# Patient Record
Sex: FEMALE | Race: WHITE | URBAN - METROPOLITAN AREA
[De-identification: names, ages, dates, MRNs, and addresses within clinical notes are randomized per-mention and may not be internally consistent; named-entity substitution may affect disease eponyms.]

---

## 2017-12-27 ENCOUNTER — NURSE ONLY (OUTPATIENT)
Dept: PRIMARY CARE CLINIC | Age: 57
End: 2017-12-27

## 2017-12-27 DIAGNOSIS — Z23 NEED FOR INFLUENZA VACCINATION: Primary | ICD-10-CM

## 2017-12-27 PROCEDURE — 90471 IMMUNIZATION ADMIN: CPT | Performed by: FAMILY MEDICINE

## 2017-12-27 PROCEDURE — 90688 IIV4 VACCINE SPLT 0.5 ML IM: CPT | Performed by: FAMILY MEDICINE

## 2017-12-27 NOTE — PROGRESS NOTES
Vaccine Information Sheet, \"Influenza - Inactivated\"  given to Ronaldo Braun, or parent/legal guardian of  Ronaldo Braun and verbalized understanding. Patient responses:    Have you ever had a reaction to a flu vaccine? No  Are you able to eat eggs without adverse effects? Yes  Do you have any current illness? No  Have you ever had Guillian Christine Syndrome? No    Flu vaccine given per order. Please see immunization tab.

## 2018-05-16 ENCOUNTER — OFFICE VISIT (OUTPATIENT)
Dept: PRIMARY CARE CLINIC | Age: 58
End: 2018-05-16
Payer: COMMERCIAL

## 2018-05-16 VITALS
OXYGEN SATURATION: 97 % | SYSTOLIC BLOOD PRESSURE: 122 MMHG | RESPIRATION RATE: 14 BRPM | DIASTOLIC BLOOD PRESSURE: 82 MMHG | WEIGHT: 275 LBS | HEART RATE: 98 BPM | HEIGHT: 65 IN | BODY MASS INDEX: 45.82 KG/M2 | TEMPERATURE: 98.3 F

## 2018-05-16 DIAGNOSIS — E78.2 MIXED HYPERLIPIDEMIA: ICD-10-CM

## 2018-05-16 DIAGNOSIS — E03.9 HYPOTHYROIDISM (ACQUIRED): ICD-10-CM

## 2018-05-16 DIAGNOSIS — S29.019A THORACIC MYOFASCIAL STRAIN, INITIAL ENCOUNTER: ICD-10-CM

## 2018-05-16 DIAGNOSIS — J40 BRONCHITIS: Primary | ICD-10-CM

## 2018-05-16 DIAGNOSIS — D51.9 ANEMIA DUE TO VITAMIN B12 DEFICIENCY, UNSPECIFIED B12 DEFICIENCY TYPE: ICD-10-CM

## 2018-05-16 DIAGNOSIS — G89.29 CHRONIC RIGHT-SIDED THORACIC BACK PAIN: ICD-10-CM

## 2018-05-16 DIAGNOSIS — K57.30 DIVERTICULOSIS OF LARGE INTESTINE WITHOUT HEMORRHAGE: ICD-10-CM

## 2018-05-16 DIAGNOSIS — M54.6 CHRONIC RIGHT-SIDED THORACIC BACK PAIN: ICD-10-CM

## 2018-05-16 DIAGNOSIS — Z12.31 SCREENING MAMMOGRAM, ENCOUNTER FOR: ICD-10-CM

## 2018-05-16 LAB
ALBUMIN SERPL-MCNC: 4.5 G/DL (ref 3.9–4.9)
ALP BLD-CCNC: 53 U/L (ref 40–130)
ALT SERPL-CCNC: 35 U/L (ref 0–33)
ANION GAP SERPL CALCULATED.3IONS-SCNC: 18 MEQ/L (ref 7–13)
AST SERPL-CCNC: 30 U/L (ref 0–35)
BASOPHILS ABSOLUTE: 0 K/UL (ref 0–0.2)
BASOPHILS RELATIVE PERCENT: 0.6 %
BILIRUB SERPL-MCNC: 0.4 MG/DL (ref 0–1.2)
BUN BLDV-MCNC: 16 MG/DL (ref 6–20)
CALCIUM SERPL-MCNC: 9.8 MG/DL (ref 8.6–10.2)
CHLORIDE BLD-SCNC: 99 MEQ/L (ref 98–107)
CHOLESTEROL, TOTAL: 203 MG/DL (ref 0–199)
CO2: 23 MEQ/L (ref 22–29)
CREAT SERPL-MCNC: 0.62 MG/DL (ref 0.5–0.9)
EOSINOPHILS ABSOLUTE: 0.1 K/UL (ref 0–0.7)
EOSINOPHILS RELATIVE PERCENT: 1.6 %
GFR AFRICAN AMERICAN: >60
GFR NON-AFRICAN AMERICAN: >60
GLOBULIN: 2.9 G/DL (ref 2.3–3.5)
GLUCOSE BLD-MCNC: 80 MG/DL (ref 74–109)
HCT VFR BLD CALC: 42.9 % (ref 37–47)
HDLC SERPL-MCNC: 69 MG/DL (ref 40–59)
HEMOGLOBIN: 14.6 G/DL (ref 12–16)
LDL CHOLESTEROL CALCULATED: 105 MG/DL (ref 0–129)
LYMPHOCYTES ABSOLUTE: 2.8 K/UL (ref 1–4.8)
LYMPHOCYTES RELATIVE PERCENT: 37.3 %
MCH RBC QN AUTO: 31.4 PG (ref 27–31.3)
MCHC RBC AUTO-ENTMCNC: 34 % (ref 33–37)
MCV RBC AUTO: 92.4 FL (ref 82–100)
MONOCYTES ABSOLUTE: 0.6 K/UL (ref 0.2–0.8)
MONOCYTES RELATIVE PERCENT: 7.3 %
NEUTROPHILS ABSOLUTE: 4.1 K/UL (ref 1.4–6.5)
NEUTROPHILS RELATIVE PERCENT: 53.2 %
PDW BLD-RTO: 13.3 % (ref 11.5–14.5)
PLATELET # BLD: 235 K/UL (ref 130–400)
POTASSIUM SERPL-SCNC: 4.1 MEQ/L (ref 3.5–5.1)
RBC # BLD: 4.64 M/UL (ref 4.2–5.4)
SODIUM BLD-SCNC: 140 MEQ/L (ref 132–144)
T4 FREE: 1.2 NG/DL (ref 0.93–1.7)
TOTAL PROTEIN: 7.4 G/DL (ref 6.4–8.1)
TRIGL SERPL-MCNC: 145 MG/DL (ref 0–200)
TSH REFLEX: 4.52 UIU/ML (ref 0.27–4.2)
WBC # BLD: 7.6 K/UL (ref 4.8–10.8)

## 2018-05-16 PROCEDURE — 99214 OFFICE O/P EST MOD 30 MIN: CPT | Performed by: FAMILY MEDICINE

## 2018-05-16 PROCEDURE — 1036F TOBACCO NON-USER: CPT | Performed by: FAMILY MEDICINE

## 2018-05-16 PROCEDURE — G8427 DOCREV CUR MEDS BY ELIG CLIN: HCPCS | Performed by: FAMILY MEDICINE

## 2018-05-16 PROCEDURE — G8417 CALC BMI ABV UP PARAM F/U: HCPCS | Performed by: FAMILY MEDICINE

## 2018-05-16 PROCEDURE — 96372 THER/PROPH/DIAG INJ SC/IM: CPT | Performed by: FAMILY MEDICINE

## 2018-05-16 PROCEDURE — 3017F COLORECTAL CA SCREEN DOC REV: CPT | Performed by: FAMILY MEDICINE

## 2018-05-16 RX ORDER — CEFTRIAXONE 1 G/1
1 INJECTION, POWDER, FOR SOLUTION INTRAMUSCULAR; INTRAVENOUS ONCE
Status: COMPLETED | OUTPATIENT
Start: 2018-05-16 | End: 2018-05-16

## 2018-05-16 RX ORDER — CEFTRIAXONE 1 G/1
1 INJECTION, POWDER, FOR SOLUTION INTRAMUSCULAR; INTRAVENOUS ONCE
Qty: 1 G | Refills: 0
Start: 2018-05-16 | End: 2018-05-16

## 2018-05-16 RX ORDER — AZITHROMYCIN 250 MG/1
TABLET, FILM COATED ORAL
Qty: 6 TABLET | Refills: 0 | Status: SHIPPED | OUTPATIENT
Start: 2018-05-16 | End: 2018-10-30

## 2018-05-16 RX ORDER — ALBUTEROL SULFATE 90 UG/1
2 AEROSOL, METERED RESPIRATORY (INHALATION) EVERY 6 HOURS PRN
Qty: 1 INHALER | Refills: 0
Start: 2018-05-16

## 2018-05-16 RX ORDER — METHOCARBAMOL 500 MG/1
500-1000 TABLET, FILM COATED ORAL NIGHTLY
Qty: 60 TABLET | Refills: 0 | Status: SHIPPED | OUTPATIENT
Start: 2018-05-16 | End: 2019-02-18 | Stop reason: SDUPTHER

## 2018-05-16 RX ADMIN — CEFTRIAXONE 1 G: 1 INJECTION, POWDER, FOR SOLUTION INTRAMUSCULAR; INTRAVENOUS at 15:21

## 2018-05-16 ASSESSMENT — PATIENT HEALTH QUESTIONNAIRE - PHQ9
SUM OF ALL RESPONSES TO PHQ9 QUESTIONS 1 & 2: 0
2. FEELING DOWN, DEPRESSED OR HOPELESS: 0
SUM OF ALL RESPONSES TO PHQ QUESTIONS 1-9: 0
1. LITTLE INTEREST OR PLEASURE IN DOING THINGS: 0

## 2018-05-16 ASSESSMENT — ENCOUNTER SYMPTOMS
SINUS PRESSURE: 1
DIARRHEA: 0
SHORTNESS OF BREATH: 0
BACK PAIN: 0
EYE REDNESS: 0
VOMITING: 0
ABDOMINAL PAIN: 0
NAUSEA: 0
GASTROINTESTINAL NEGATIVE: 1
EYE ITCHING: 0
SWOLLEN GLANDS: 0
SINUS PAIN: 0
PHOTOPHOBIA: 0
WHEEZING: 0
CONSTIPATION: 0
COUGH: 1
SORE THROAT: 1
CHEST TIGHTNESS: 0
RHINORRHEA: 0
EYES NEGATIVE: 1

## 2018-09-19 ENCOUNTER — NURSE ONLY (OUTPATIENT)
Dept: PRIMARY CARE CLINIC | Age: 58
End: 2018-09-19
Payer: COMMERCIAL

## 2018-09-19 DIAGNOSIS — Z23 NEED FOR INFLUENZA VACCINATION: Primary | ICD-10-CM

## 2018-09-19 PROCEDURE — 90471 IMMUNIZATION ADMIN: CPT | Performed by: FAMILY MEDICINE

## 2018-09-19 PROCEDURE — 90688 IIV4 VACCINE SPLT 0.5 ML IM: CPT | Performed by: FAMILY MEDICINE

## 2018-10-30 ENCOUNTER — OFFICE VISIT (OUTPATIENT)
Dept: PRIMARY CARE CLINIC | Age: 58
End: 2018-10-30
Payer: COMMERCIAL

## 2018-10-30 VITALS
SYSTOLIC BLOOD PRESSURE: 124 MMHG | DIASTOLIC BLOOD PRESSURE: 82 MMHG | BODY MASS INDEX: 46.57 KG/M2 | WEIGHT: 279.5 LBS | RESPIRATION RATE: 14 BRPM | TEMPERATURE: 99.1 F | HEIGHT: 65 IN | HEART RATE: 82 BPM

## 2018-10-30 DIAGNOSIS — R50.9 FEVER, UNSPECIFIED FEVER CAUSE: ICD-10-CM

## 2018-10-30 DIAGNOSIS — J01.00 ACUTE NON-RECURRENT MAXILLARY SINUSITIS: Primary | ICD-10-CM

## 2018-10-30 DIAGNOSIS — J02.9 SORE THROAT: ICD-10-CM

## 2018-10-30 LAB
INFLUENZA A ANTIBODY: NEGATIVE
INFLUENZA B ANTIBODY: NEGATIVE
S PYO AG THROAT QL: NORMAL

## 2018-10-30 PROCEDURE — 87804 INFLUENZA ASSAY W/OPTIC: CPT | Performed by: NURSE PRACTITIONER

## 2018-10-30 PROCEDURE — 1036F TOBACCO NON-USER: CPT | Performed by: NURSE PRACTITIONER

## 2018-10-30 PROCEDURE — 3017F COLORECTAL CA SCREEN DOC REV: CPT | Performed by: NURSE PRACTITIONER

## 2018-10-30 PROCEDURE — G8417 CALC BMI ABV UP PARAM F/U: HCPCS | Performed by: NURSE PRACTITIONER

## 2018-10-30 PROCEDURE — G8427 DOCREV CUR MEDS BY ELIG CLIN: HCPCS | Performed by: NURSE PRACTITIONER

## 2018-10-30 PROCEDURE — 99213 OFFICE O/P EST LOW 20 MIN: CPT | Performed by: NURSE PRACTITIONER

## 2018-10-30 PROCEDURE — 87880 STREP A ASSAY W/OPTIC: CPT | Performed by: NURSE PRACTITIONER

## 2018-10-30 PROCEDURE — G8482 FLU IMMUNIZE ORDER/ADMIN: HCPCS | Performed by: NURSE PRACTITIONER

## 2018-10-30 RX ORDER — AZITHROMYCIN 250 MG/1
TABLET, FILM COATED ORAL
Qty: 1 PACKET | Refills: 0 | Status: SHIPPED | OUTPATIENT
Start: 2018-10-30 | End: 2019-02-18

## 2018-10-30 ASSESSMENT — ENCOUNTER SYMPTOMS
COUGH: 1
SHORTNESS OF BREATH: 1
WHEEZING: 1
RHINORRHEA: 1
SINUS PRESSURE: 1
SORE THROAT: 1

## 2018-10-30 NOTE — LETTER
[address]  [address] [facility] [address]  Phone: [phone]  Fax: 1 [facility], APRN - CNP        October 30, 2018     Patient: Breanna Rios   YOB: 1960   Date of Visit: 10/30/2018       To Whom It May Concern: It is my medical opinion that Dorita Bose may return to work on 11/1/18 and may be excused for missing 10/29-10/31. If you have any questions or concerns, please don't hesitate to call.     Sincerely,      Donna Brewer APRN - CNP

## 2018-10-30 NOTE — PROGRESS NOTES
Influenza A/B       Plan:      Orders Placed This Encounter   Procedures    POCT rapid strep A    POCT Influenza A/B     Results for POC orders placed in visit on 10/30/18   POCT Influenza A/B   Result Value Ref Range    Influenza A Ab negative     Influenza B Ab negative    POCT rapid strep A   Result Value Ref Range    Strep A Ag None Detected None Detected         Orders Placed This Encounter   Medications    azithromycin (ZITHROMAX) 250 MG tablet     Sig: Take 2 tabs (500 mg) on Day 1, and take 1 tab (250 mg) on days 2 through 5. Dispense:  1 packet     Refill:  0       Return if symptoms worsen or fail to improve. Encouraged increase in fluids and rest. Ibuprofen and tylenol as needed. Discussed otc comfort care. Reviewed with the patient: current clinicalstatus, medications, activities and diet. Side effects, adverse effects of the medication prescribedtoday, as well as treatment plan/ rationale and result expectations have been discussedwith the patient who expresses understanding and desires to proceed. Close follow upto evaluate treatment results and for coordination of care. I have reviewedthe patient's medical history in detail and updated the computerized patient record.     Jose Nguyen, APRN - CNP

## 2019-02-18 ENCOUNTER — OFFICE VISIT (OUTPATIENT)
Dept: PRIMARY CARE CLINIC | Age: 59
End: 2019-02-18
Payer: COMMERCIAL

## 2019-02-18 VITALS
RESPIRATION RATE: 16 BRPM | TEMPERATURE: 98.1 F | HEART RATE: 80 BPM | WEIGHT: 280 LBS | DIASTOLIC BLOOD PRESSURE: 80 MMHG | SYSTOLIC BLOOD PRESSURE: 130 MMHG | OXYGEN SATURATION: 98 % | BODY MASS INDEX: 46.65 KG/M2 | HEIGHT: 65 IN

## 2019-02-18 DIAGNOSIS — E03.9 HYPOTHYROIDISM, UNSPECIFIED TYPE: ICD-10-CM

## 2019-02-18 DIAGNOSIS — R10.11 RIGHT UPPER QUADRANT ABDOMINAL PAIN: ICD-10-CM

## 2019-02-18 DIAGNOSIS — M25.512 ACUTE PAIN OF LEFT SHOULDER: Primary | ICD-10-CM

## 2019-02-18 DIAGNOSIS — E78.2 MIXED HYPERLIPIDEMIA: ICD-10-CM

## 2019-02-18 DIAGNOSIS — R79.89 ABNORMAL LFTS: ICD-10-CM

## 2019-02-18 DIAGNOSIS — S29.019A THORACIC MYOFASCIAL STRAIN, INITIAL ENCOUNTER: ICD-10-CM

## 2019-02-18 LAB
ALBUMIN SERPL-MCNC: 4.2 G/DL (ref 3.5–4.6)
ALP BLD-CCNC: 43 U/L (ref 40–130)
ALT SERPL-CCNC: 22 U/L (ref 0–33)
ANION GAP SERPL CALCULATED.3IONS-SCNC: 15 MEQ/L (ref 9–15)
AST SERPL-CCNC: 23 U/L (ref 0–35)
BASOPHILS ABSOLUTE: 0.1 K/UL (ref 0–0.2)
BASOPHILS RELATIVE PERCENT: 1.1 %
BILIRUB SERPL-MCNC: 0.4 MG/DL (ref 0.2–0.7)
BUN BLDV-MCNC: 12 MG/DL (ref 6–20)
CALCIUM SERPL-MCNC: 9.9 MG/DL (ref 8.5–9.9)
CHLORIDE BLD-SCNC: 103 MEQ/L (ref 95–107)
CO2: 25 MEQ/L (ref 20–31)
CREAT SERPL-MCNC: 0.55 MG/DL (ref 0.5–0.9)
EOSINOPHILS ABSOLUTE: 0.1 K/UL (ref 0–0.7)
EOSINOPHILS RELATIVE PERCENT: 1.9 %
GFR AFRICAN AMERICAN: >60
GFR NON-AFRICAN AMERICAN: >60
GLOBULIN: 3.6 G/DL (ref 2.3–3.5)
GLUCOSE BLD-MCNC: 93 MG/DL (ref 70–99)
HCT VFR BLD CALC: 41.9 % (ref 37–47)
HEMOGLOBIN: 13.9 G/DL (ref 12–16)
LYMPHOCYTES ABSOLUTE: 3.1 K/UL (ref 1–4.8)
LYMPHOCYTES RELATIVE PERCENT: 42.7 %
MCH RBC QN AUTO: 30.6 PG (ref 27–31.3)
MCHC RBC AUTO-ENTMCNC: 33.1 % (ref 33–37)
MCV RBC AUTO: 92.4 FL (ref 82–100)
MONOCYTES ABSOLUTE: 0.5 K/UL (ref 0.2–0.8)
MONOCYTES RELATIVE PERCENT: 6.4 %
NEUTROPHILS ABSOLUTE: 3.5 K/UL (ref 1.4–6.5)
NEUTROPHILS RELATIVE PERCENT: 47.9 %
PDW BLD-RTO: 13.9 % (ref 11.5–14.5)
PLATELET # BLD: 231 K/UL (ref 130–400)
POTASSIUM SERPL-SCNC: 4.4 MEQ/L (ref 3.4–4.9)
RBC # BLD: 4.53 M/UL (ref 4.2–5.4)
SODIUM BLD-SCNC: 143 MEQ/L (ref 135–144)
TOTAL PROTEIN: 7.8 G/DL (ref 6.3–8)
WBC # BLD: 7.3 K/UL (ref 4.8–10.8)

## 2019-02-18 PROCEDURE — G8482 FLU IMMUNIZE ORDER/ADMIN: HCPCS | Performed by: FAMILY MEDICINE

## 2019-02-18 PROCEDURE — 1036F TOBACCO NON-USER: CPT | Performed by: FAMILY MEDICINE

## 2019-02-18 PROCEDURE — G8427 DOCREV CUR MEDS BY ELIG CLIN: HCPCS | Performed by: FAMILY MEDICINE

## 2019-02-18 PROCEDURE — G8417 CALC BMI ABV UP PARAM F/U: HCPCS | Performed by: FAMILY MEDICINE

## 2019-02-18 PROCEDURE — 99214 OFFICE O/P EST MOD 30 MIN: CPT | Performed by: FAMILY MEDICINE

## 2019-02-18 PROCEDURE — 3017F COLORECTAL CA SCREEN DOC REV: CPT | Performed by: FAMILY MEDICINE

## 2019-02-18 RX ORDER — PREDNISONE 10 MG/1
TABLET ORAL
Qty: 20 TABLET | Refills: 0 | Status: SHIPPED | OUTPATIENT
Start: 2019-02-18 | End: 2019-03-04

## 2019-02-18 RX ORDER — METHOCARBAMOL 500 MG/1
500-1000 TABLET, FILM COATED ORAL 3 TIMES DAILY
Qty: 90 TABLET | Refills: 1 | Status: SHIPPED | OUTPATIENT
Start: 2019-02-18 | End: 2019-02-28

## 2019-02-18 RX ORDER — OMEPRAZOLE 40 MG/1
40 CAPSULE, DELAYED RELEASE ORAL DAILY
Qty: 30 CAPSULE | Refills: 0 | Status: SHIPPED | OUTPATIENT
Start: 2019-02-18 | End: 2019-03-25 | Stop reason: SDUPTHER

## 2019-02-18 ASSESSMENT — ENCOUNTER SYMPTOMS
DIARRHEA: 0
HEMATOCHEZIA: 0
VOMITING: 0
FLATUS: 0
CONSTIPATION: 0
NAUSEA: 0
BELCHING: 0
ABDOMINAL PAIN: 1

## 2019-02-18 ASSESSMENT — PATIENT HEALTH QUESTIONNAIRE - PHQ9
2. FEELING DOWN, DEPRESSED OR HOPELESS: 1
SUM OF ALL RESPONSES TO PHQ9 QUESTIONS 1 & 2: 2
SUM OF ALL RESPONSES TO PHQ QUESTIONS 1-9: 2
1. LITTLE INTEREST OR PLEASURE IN DOING THINGS: 1
SUM OF ALL RESPONSES TO PHQ QUESTIONS 1-9: 2

## 2019-02-20 LAB
HCV QNT BY NAAT IU/ML: NOT DETECTED IU/ML
HCV QNT BY NAAT LOG IU/ML: NOT DETECTED LOG IU/ML
INTERPRETATION: NOT DETECTED

## 2019-02-20 ASSESSMENT — ENCOUNTER SYMPTOMS
EYES NEGATIVE: 1
PHOTOPHOBIA: 0
EYE ITCHING: 0
WHEEZING: 0
ABDOMINAL DISTENTION: 1
BACK PAIN: 0
EYE REDNESS: 0
SHORTNESS OF BREATH: 0
RESPIRATORY NEGATIVE: 1
BLOOD IN STOOL: 0
COUGH: 0

## 2019-03-18 ENCOUNTER — OFFICE VISIT (OUTPATIENT)
Dept: PRIMARY CARE CLINIC | Age: 59
End: 2019-03-18
Payer: COMMERCIAL

## 2019-03-18 VITALS
BODY MASS INDEX: 48.17 KG/M2 | WEIGHT: 289.1 LBS | DIASTOLIC BLOOD PRESSURE: 78 MMHG | HEIGHT: 65 IN | SYSTOLIC BLOOD PRESSURE: 122 MMHG | HEART RATE: 49 BPM | OXYGEN SATURATION: 100 % | TEMPERATURE: 97.7 F

## 2019-03-18 DIAGNOSIS — M54.5 ACUTE LOW BACK PAIN, UNSPECIFIED BACK PAIN LATERALITY, WITH SCIATICA PRESENCE UNSPECIFIED: Primary | ICD-10-CM

## 2019-03-18 DIAGNOSIS — M62.830 BACK SPASM: ICD-10-CM

## 2019-03-18 PROCEDURE — G8417 CALC BMI ABV UP PARAM F/U: HCPCS | Performed by: NURSE PRACTITIONER

## 2019-03-18 PROCEDURE — G8427 DOCREV CUR MEDS BY ELIG CLIN: HCPCS | Performed by: NURSE PRACTITIONER

## 2019-03-18 PROCEDURE — 99213 OFFICE O/P EST LOW 20 MIN: CPT | Performed by: NURSE PRACTITIONER

## 2019-03-18 PROCEDURE — 1036F TOBACCO NON-USER: CPT | Performed by: NURSE PRACTITIONER

## 2019-03-18 PROCEDURE — 3017F COLORECTAL CA SCREEN DOC REV: CPT | Performed by: NURSE PRACTITIONER

## 2019-03-18 PROCEDURE — G8482 FLU IMMUNIZE ORDER/ADMIN: HCPCS | Performed by: NURSE PRACTITIONER

## 2019-03-18 RX ORDER — PREDNISONE 20 MG/1
40 TABLET ORAL
COMMUNITY
Start: 2019-03-14 | End: 2019-03-19

## 2019-03-18 RX ORDER — TIZANIDINE HYDROCHLORIDE 4 MG/1
4 CAPSULE, GELATIN COATED ORAL
COMMUNITY
Start: 2019-03-14 | End: 2019-03-18 | Stop reason: ALTCHOICE

## 2019-03-18 RX ORDER — NAPROXEN 500 MG/1
500 TABLET ORAL 2 TIMES DAILY WITH MEALS
Qty: 60 TABLET | Refills: 3 | Status: SHIPPED | OUTPATIENT
Start: 2019-03-18

## 2019-03-18 RX ORDER — TIZANIDINE 4 MG/1
4 TABLET ORAL 3 TIMES DAILY
Qty: 30 TABLET | Refills: 0 | Status: SHIPPED | OUTPATIENT
Start: 2019-03-18

## 2019-03-18 RX ORDER — KETOROLAC TROMETHAMINE 10 MG/1
10 TABLET, FILM COATED ORAL
COMMUNITY
Start: 2019-03-14

## 2019-03-18 ASSESSMENT — ENCOUNTER SYMPTOMS
BACK PAIN: 1
BOWEL INCONTINENCE: 0
COLOR CHANGE: 0

## 2019-03-22 ENCOUNTER — TELEPHONE (OUTPATIENT)
Dept: FAMILY MEDICINE CLINIC | Age: 59
End: 2019-03-22

## 2019-03-25 DIAGNOSIS — R10.11 RIGHT UPPER QUADRANT ABDOMINAL PAIN: ICD-10-CM

## 2019-03-25 RX ORDER — OMEPRAZOLE 40 MG/1
40 CAPSULE, DELAYED RELEASE ORAL DAILY
Qty: 30 CAPSULE | Refills: 0 | Status: SHIPPED | OUTPATIENT
Start: 2019-03-25

## 2019-03-25 RX ORDER — OMEPRAZOLE 40 MG/1
40 CAPSULE, DELAYED RELEASE ORAL DAILY
Qty: 30 CAPSULE | Refills: 0 | Status: SHIPPED | OUTPATIENT
Start: 2019-03-25 | End: 2019-03-25 | Stop reason: SDUPTHER

## 2023-03-29 LAB
ALANINE AMINOTRANSFERASE (SGPT) (U/L) IN SER/PLAS: 19 U/L (ref 7–45)
ALBUMIN (G/DL) IN SER/PLAS: 4.1 G/DL (ref 3.4–5)
ALKALINE PHOSPHATASE (U/L) IN SER/PLAS: 46 U/L (ref 33–136)
ANION GAP IN SER/PLAS: 11 MMOL/L (ref 10–20)
ASPARTATE AMINOTRANSFERASE (SGOT) (U/L) IN SER/PLAS: 20 U/L (ref 9–39)
BASOPHILS (10*3/UL) IN BLOOD BY AUTOMATED COUNT: 0.04 X10E9/L (ref 0–0.1)
BASOPHILS/100 LEUKOCYTES IN BLOOD BY AUTOMATED COUNT: 0.5 % (ref 0–2)
BILIRUBIN TOTAL (MG/DL) IN SER/PLAS: 0.3 MG/DL (ref 0–1.2)
CALCIUM (MG/DL) IN SER/PLAS: 8.9 MG/DL (ref 8.6–10.3)
CARBON DIOXIDE, TOTAL (MMOL/L) IN SER/PLAS: 29 MMOL/L (ref 21–32)
CHLORIDE (MMOL/L) IN SER/PLAS: 104 MMOL/L (ref 98–107)
CHOLESTEROL (MG/DL) IN SER/PLAS: 205 MG/DL (ref 0–199)
CHOLESTEROL IN HDL (MG/DL) IN SER/PLAS: 63.5 MG/DL
CHOLESTEROL/HDL RATIO: 3.2
CREATININE (MG/DL) IN SER/PLAS: 1.01 MG/DL (ref 0.5–1.05)
EOSINOPHILS (10*3/UL) IN BLOOD BY AUTOMATED COUNT: 0.14 X10E9/L (ref 0–0.7)
EOSINOPHILS/100 LEUKOCYTES IN BLOOD BY AUTOMATED COUNT: 1.9 % (ref 0–6)
ERYTHROCYTE DISTRIBUTION WIDTH (RATIO) BY AUTOMATED COUNT: 14.1 % (ref 11.5–14.5)
ERYTHROCYTE MEAN CORPUSCULAR HEMOGLOBIN CONCENTRATION (G/DL) BY AUTOMATED: 32.8 G/DL (ref 32–36)
ERYTHROCYTE MEAN CORPUSCULAR VOLUME (FL) BY AUTOMATED COUNT: 91 FL (ref 80–100)
ERYTHROCYTES (10*6/UL) IN BLOOD BY AUTOMATED COUNT: 4.52 X10E12/L (ref 4–5.2)
GFR FEMALE: 64 ML/MIN/1.73M2
GLUCOSE (MG/DL) IN SER/PLAS: 94 MG/DL (ref 74–99)
HEMATOCRIT (%) IN BLOOD BY AUTOMATED COUNT: 41.2 % (ref 36–46)
HEMOGLOBIN (G/DL) IN BLOOD: 13.5 G/DL (ref 12–16)
IMMATURE GRANULOCYTES/100 LEUKOCYTES IN BLOOD BY AUTOMATED COUNT: 0.3 % (ref 0–0.9)
LDL: 106 MG/DL (ref 0–99)
LEUKOCYTES (10*3/UL) IN BLOOD BY AUTOMATED COUNT: 7.4 X10E9/L (ref 4.4–11.3)
LYMPHOCYTES (10*3/UL) IN BLOOD BY AUTOMATED COUNT: 3.29 X10E9/L (ref 1.2–4.8)
LYMPHOCYTES/100 LEUKOCYTES IN BLOOD BY AUTOMATED COUNT: 44.6 % (ref 13–44)
MONOCYTES (10*3/UL) IN BLOOD BY AUTOMATED COUNT: 0.55 X10E9/L (ref 0.1–1)
MONOCYTES/100 LEUKOCYTES IN BLOOD BY AUTOMATED COUNT: 7.5 % (ref 2–10)
NEUTROPHILS (10*3/UL) IN BLOOD BY AUTOMATED COUNT: 3.33 X10E9/L (ref 1.2–7.7)
NEUTROPHILS/100 LEUKOCYTES IN BLOOD BY AUTOMATED COUNT: 45.2 % (ref 40–80)
PLATELETS (10*3/UL) IN BLOOD AUTOMATED COUNT: 245 X10E9/L (ref 150–450)
POTASSIUM (MMOL/L) IN SER/PLAS: 4.4 MMOL/L (ref 3.5–5.3)
PROTEIN TOTAL: 7.7 G/DL (ref 6.4–8.2)
SODIUM (MMOL/L) IN SER/PLAS: 140 MMOL/L (ref 136–145)
THYROTROPIN (MIU/L) IN SER/PLAS BY DETECTION LIMIT <= 0.05 MIU/L: 2.34 MIU/L (ref 0.44–3.98)
TRIGLYCERIDE (MG/DL) IN SER/PLAS: 176 MG/DL (ref 0–149)
UREA NITROGEN (MG/DL) IN SER/PLAS: 13 MG/DL (ref 6–23)
VLDL: 35 MG/DL (ref 0–40)

## 2023-03-30 ENCOUNTER — TELEPHONE (OUTPATIENT)
Dept: PRIMARY CARE | Facility: CLINIC | Age: 63
End: 2023-03-30
Payer: COMMERCIAL

## 2023-03-30 LAB
CALCIDIOL (25 OH VITAMIN D3) (NG/ML) IN SER/PLAS: 24 NG/ML
THYROXINE (T4) (UG/DL) IN SER/PLAS: 10.2 UG/DL (ref 4.5–11.1)
THYROXINE (T4) FREE INDEX IN SER/PLAS BY CALCULATION: 3.2 (ref 1.6–4.7)
TRIIODOTHYRONINE RESIN UPTAKE (T3RU) % IN SER/PLAS: 31 % (ref 24–41)

## 2023-03-30 NOTE — TELEPHONE ENCOUNTER
----- Message from Todd Sparrow DO sent at 3/29/2023 11:57 PM EDT -----  Please notify patient of normal results.  Thank you

## 2023-03-31 ENCOUNTER — TELEPHONE (OUTPATIENT)
Dept: PRIMARY CARE | Facility: CLINIC | Age: 63
End: 2023-03-31
Payer: COMMERCIAL

## 2023-03-31 DIAGNOSIS — H81.11 BENIGN PAROXYSMAL POSITIONAL VERTIGO OF RIGHT EAR: Primary | ICD-10-CM

## 2023-03-31 RX ORDER — MECLIZINE HYDROCHLORIDE 25 MG/1
25 TABLET ORAL
Qty: 60 TABLET | Refills: 1 | Status: SHIPPED | OUTPATIENT
Start: 2023-03-31 | End: 2023-04-30

## 2023-03-31 NOTE — TELEPHONE ENCOUNTER
----- Message from Todd Sparrow DO sent at 3/30/2023 10:25 PM EDT -----  Vitamin D low at 24.  Continue with vitamin D supplementation at 3000-units daily.  Thank you

## 2023-04-04 ENCOUNTER — TELEPHONE (OUTPATIENT)
Dept: PRIMARY CARE | Facility: CLINIC | Age: 63
End: 2023-04-04
Payer: COMMERCIAL

## 2023-04-04 NOTE — TELEPHONE ENCOUNTER
----- Message from Todd Sparrow DO sent at 3/31/2023 11:45 PM EDT -----  Please notify patient of normal results.  Thank you

## 2023-04-05 DIAGNOSIS — E03.8 OTHER SPECIFIED HYPOTHYROIDISM: Primary | ICD-10-CM

## 2023-04-05 PROBLEM — E03.9 ACQUIRED HYPOTHYROIDISM: Status: ACTIVE | Noted: 2023-04-05

## 2023-04-05 RX ORDER — LEVOTHYROXINE SODIUM 50 UG/1
50 TABLET ORAL DAILY
Qty: 90 TABLET | Refills: 0 | Status: SHIPPED | OUTPATIENT
Start: 2023-04-05 | End: 2023-07-28 | Stop reason: SDUPTHER

## 2023-07-11 ENCOUNTER — TELEMEDICINE (OUTPATIENT)
Dept: PRIMARY CARE | Facility: CLINIC | Age: 63
End: 2023-07-11
Payer: COMMERCIAL

## 2023-07-11 VITALS — HEIGHT: 65 IN | BODY MASS INDEX: 44.15 KG/M2 | WEIGHT: 265 LBS

## 2023-07-11 DIAGNOSIS — N76.89 VAGINA BOIL: ICD-10-CM

## 2023-07-11 DIAGNOSIS — S39.93XA INJURY OF VAGINA, INITIAL ENCOUNTER: ICD-10-CM

## 2023-07-11 PROBLEM — E55.9 VITAMIN D DEFICIENCY: Status: ACTIVE | Noted: 2023-07-11

## 2023-07-11 PROBLEM — R53.83 FATIGUE: Status: ACTIVE | Noted: 2023-07-11

## 2023-07-11 PROBLEM — E78.5 HYPERLIPIDEMIA: Status: ACTIVE | Noted: 2023-07-11

## 2023-07-11 PROCEDURE — 99213 OFFICE O/P EST LOW 20 MIN: CPT | Performed by: FAMILY MEDICINE

## 2023-07-11 RX ORDER — AMOXICILLIN AND CLAVULANATE POTASSIUM 875; 125 MG/1; MG/1
875 TABLET, FILM COATED ORAL 2 TIMES DAILY
Qty: 20 TABLET | Refills: 0 | Status: SHIPPED | OUTPATIENT
Start: 2023-07-11 | End: 2023-07-21

## 2023-07-11 ASSESSMENT — ENCOUNTER SYMPTOMS
FEVER: 0
SPEECH DIFFICULTY: 0
DIZZINESS: 0
STRIDOR: 0
DYSPHORIC MOOD: 0
EYE PAIN: 0
DECREASED CONCENTRATION: 0
AGITATION: 0
MYALGIAS: 0
PALPITATIONS: 0
DIARRHEA: 0
SINUS PRESSURE: 0
FLANK PAIN: 0
POLYPHAGIA: 0
BLOOD IN STOOL: 0
SHORTNESS OF BREATH: 0
RHINORRHEA: 0
CONSTITUTIONAL NEGATIVE: 1
HEADACHES: 0
ADENOPATHY: 0
ABDOMINAL DISTENTION: 0
FATIGUE: 0
TROUBLE SWALLOWING: 0
APPETITE CHANGE: 0
ACTIVITY CHANGE: 0
CONFUSION: 0
COLOR CHANGE: 0
NECK STIFFNESS: 0
PHOTOPHOBIA: 0
CHEST TIGHTNESS: 0
RECTAL PAIN: 0
ARTHRALGIAS: 0
SLEEP DISTURBANCE: 0
SEIZURES: 0
COUGH: 0
HEMATURIA: 0
SINUS PAIN: 0
SORE THROAT: 0
CONSTIPATION: 0
ABDOMINAL PAIN: 0
NERVOUS/ANXIOUS: 0
POLYDIPSIA: 0
DYSURIA: 0

## 2023-07-11 ASSESSMENT — PATIENT HEALTH QUESTIONNAIRE - PHQ9
SUM OF ALL RESPONSES TO PHQ9 QUESTIONS 1 AND 2: 0
2. FEELING DOWN, DEPRESSED OR HOPELESS: NOT AT ALL
1. LITTLE INTEREST OR PLEASURE IN DOING THINGS: NOT AT ALL

## 2023-07-11 NOTE — PATIENT INSTRUCTIONS
Follow up in 10 days    Continue current medications and therapy for chronic medical conditions.    Patient was advised importance of proper diet/nutrition in addition adequate hydration. Patient was encouraged moderate exercise program to include 30 minutes daily for 5 days of the week or 150 minutes weekly. Patient will follow-up with us as scheduled.    OBGYN referral ordered     Take Augmentin 875mg BID x 10 days     Avoid bike riding/local irritation

## 2023-07-11 NOTE — PROGRESS NOTES
"Subjective   Patient ID: Anastacia Bhandari is a 62 y.o. female who presents for Vaginal Injury.    Patient is here due to feeing something in her vagina, states she was riding her stationary bike Sunday for 35 mins and felt a sore on the inside of her vagina wall that is marble sized. States she has a picture of it. Patient states her  told her it is red, swollen and is causing pain when she is walking.     Patient denies any other concerns          Review of Systems   Constitutional: Negative.  Negative for activity change, appetite change, fatigue and fever.   HENT:  Negative for congestion, dental problem, ear discharge, ear pain, mouth sores, rhinorrhea, sinus pressure, sinus pain, sore throat, tinnitus and trouble swallowing.    Eyes:  Negative for photophobia, pain and visual disturbance.   Respiratory:  Negative for cough, chest tightness, shortness of breath and stridor.    Cardiovascular:  Negative for chest pain and palpitations.   Gastrointestinal:  Negative for abdominal distention, abdominal pain, blood in stool, constipation, diarrhea and rectal pain.   Endocrine: Negative for cold intolerance, heat intolerance, polydipsia, polyphagia and polyuria.   Genitourinary:  Negative for dysuria, flank pain, hematuria and urgency.   Musculoskeletal:  Negative for arthralgias, gait problem, myalgias and neck stiffness.   Skin:  Negative for color change and rash.   Allergic/Immunologic: Negative for environmental allergies and food allergies.   Neurological:  Negative for dizziness, seizures, syncope, speech difficulty and headaches.   Hematological:  Negative for adenopathy.   Psychiatric/Behavioral:  Negative for agitation, confusion, decreased concentration, dysphoric mood and sleep disturbance. The patient is not nervous/anxious.        Objective   Ht 1.651 m (5' 5\")   Wt 120 kg (265 lb)   BMI 44.10 kg/m²     Physical Exam  Constitutional:       Appearance: Normal appearance. She is obese.   HENT: "      Head: Normocephalic.   Eyes:      Extraocular Movements: Extraocular movements intact.      Pupils: Pupils are equal, round, and reactive to light.   Abdominal:      General: There is distension.   Musculoskeletal:      Cervical back: Normal range of motion.   Psychiatric:         Mood and Affect: Mood normal.         Behavior: Behavior normal.         Thought Content: Thought content normal.         Judgment: Judgment normal.       Constitutional: Well developed, well nourished, alert and in no acute distress       Assessment/Plan   Problem List Items Addressed This Visit    None  Visit Diagnoses       Injury of vagina, initial encounter        Relevant Orders    Referral to Obstetrics / Gynecology    Vagina boil               Scribe Attestation  By signing my name below, IMonica , Scribe   attest that this documentation has been prepared under the direction and in the presence of Todd Sparrow DO.  Provider Attestation - Scribe documentation  All medical record entries made by the Scribe were at my direction and personally dictated by me. I have reviewed the chart and agree that the record accurately reflects my personal performance of the history, physical exam, discussion and plan.

## 2023-07-26 DIAGNOSIS — E03.8 OTHER SPECIFIED HYPOTHYROIDISM: ICD-10-CM

## 2023-07-26 NOTE — TELEPHONE ENCOUNTER
Patient of Dr. Sparrow     Refill request    Levothyroxine 50 mcg tablet       Pharmacy [facility]

## 2023-07-28 RX ORDER — LEVOTHYROXINE SODIUM 50 UG/1
50 TABLET ORAL DAILY
Qty: 90 TABLET | Refills: 1 | Status: SHIPPED | OUTPATIENT
Start: 2023-07-28 | End: 2023-08-21 | Stop reason: SDUPTHER

## 2023-07-28 NOTE — TELEPHONE ENCOUNTER
Patient of Dr. Sparrow      Refill request     Levothyroxine 50 mcg tablet         Pharmacy [facility]    PLEASE ADVISE

## 2023-08-21 DIAGNOSIS — E03.8 OTHER SPECIFIED HYPOTHYROIDISM: Primary | ICD-10-CM

## 2023-08-21 RX ORDER — LEVOTHYROXINE SODIUM 50 UG/1
50 TABLET ORAL DAILY
Qty: 90 TABLET | Refills: 1 | Status: SHIPPED | OUTPATIENT
Start: 2023-08-21 | End: 2023-11-03 | Stop reason: SDUPTHER

## 2023-10-10 ENCOUNTER — APPOINTMENT (OUTPATIENT)
Dept: PRIMARY CARE | Facility: CLINIC | Age: 63
End: 2023-10-10
Payer: COMMERCIAL

## 2023-11-03 DIAGNOSIS — E03.8 OTHER SPECIFIED HYPOTHYROIDISM: ICD-10-CM

## 2023-11-03 RX ORDER — LEVOTHYROXINE SODIUM 50 UG/1
50 TABLET ORAL DAILY
Qty: 90 TABLET | Refills: 1 | Status: SHIPPED | OUTPATIENT
Start: 2023-11-03 | End: 2024-04-15

## 2023-11-03 NOTE — TELEPHONE ENCOUNTER
PT  REFILL ON   levothyroxine (Synthroid, Levoxyl) 50    Pharmacy    [facility]/PHARMACY #[id number] - [facility] [id number] [address]

## 2024-03-19 ENCOUNTER — TELEMEDICINE (OUTPATIENT)
Dept: PRIMARY CARE | Facility: CLINIC | Age: 64
End: 2024-03-19
Payer: COMMERCIAL

## 2024-03-19 DIAGNOSIS — Z12.31 ENCOUNTER FOR SCREENING MAMMOGRAM FOR BREAST CANCER: ICD-10-CM

## 2024-03-19 DIAGNOSIS — A09 DIARRHEA OF INFECTIOUS ORIGIN: Primary | ICD-10-CM

## 2024-03-19 DIAGNOSIS — A08.4 GASTROENTERITIS AND COLITIS, VIRAL: ICD-10-CM

## 2024-03-19 PROBLEM — E66.01 OBESITY, MORBID (MULTI): Status: ACTIVE | Noted: 2024-03-19

## 2024-03-19 PROBLEM — G45.9 TIA (TRANSIENT ISCHEMIC ATTACK): Status: ACTIVE | Noted: 2024-03-19

## 2024-03-19 PROCEDURE — 99213 OFFICE O/P EST LOW 20 MIN: CPT | Performed by: FAMILY MEDICINE

## 2024-03-19 ASSESSMENT — ENCOUNTER SYMPTOMS
SORE THROAT: 0
SHORTNESS OF BREATH: 0
TROUBLE SWALLOWING: 0
SINUS PRESSURE: 0
RHINORRHEA: 0
STRIDOR: 0
ABDOMINAL PAIN: 1
SINUS PAIN: 0
COUGH: 0
CHEST TIGHTNESS: 0

## 2024-03-19 NOTE — PROGRESS NOTES
Subjective   Patient ID: Anastacia Bhandari is a 63 y.o. female who presents for Diarrhea.    VIRTUAL     Patient presents today for evaluation of diarrhea. Onset was last night. Patient has had about 7 episodes of diarrhea since this morning. Admits to abdominal discomfort. States her bowel movements are liquid and now a dark brown color.     Diarrhea   This is a new problem. The current episode started yesterday. The problem occurs 5 to 10 times per day. The problem has been unchanged. The stool consistency is described as Watery. Associated symptoms include abdominal pain. Pertinent negatives include no coughing.        Review of Systems   HENT:  Negative for congestion, dental problem, ear discharge, ear pain, mouth sores, rhinorrhea, sinus pressure, sinus pain, sore throat, tinnitus and trouble swallowing.    Respiratory:  Negative for cough, chest tightness, shortness of breath and stridor.    Gastrointestinal:  Positive for abdominal pain.       Objective   There were no vitals taken for this visit.    Physical Exam    Constitutional: Well developed, well nourished, alert and in no acute distress   Psychiatric: Mood calm and affect normal    Virtual Visit - Audio and Visual Communication Real Time      Assessment/Plan   Problem List Items Addressed This Visit    None  Visit Diagnoses         Codes    Diarrhea of infectious origin    -  Primary A09    Gastroenteritis and colitis, viral     A08.4    Encounter for screening mammogram for breast cancer     Z12.31    Relevant Orders    BI mammo bilateral screening tomosynthesis

## 2024-03-19 NOTE — PATIENT INSTRUCTIONS
Follow up in 1 week    Continue current medications and therapy for chronic medical conditions.    Patient was advised importance of proper diet/nutrition in addition adequate hydration. Patient was encouraged moderate exercise program to include 30 minutes daily for 5 days of the week or 150 minutes weekly. Patient will follow-up with us as scheduled.    BRAT diet for the next 48 hours    Start Flagyl 500 mg 3 times daily    Schedule screening mammogram

## 2024-03-20 DIAGNOSIS — R19.7 DIARRHEA, UNSPECIFIED TYPE: ICD-10-CM

## 2024-03-20 RX ORDER — LOPERAMIDE HCL 2 MG
2 TABLET ORAL 4 TIMES DAILY PRN
Qty: 30 TABLET | Refills: 0 | Status: SHIPPED | OUTPATIENT
Start: 2024-03-20 | End: 2024-03-30

## 2024-03-20 RX ORDER — METRONIDAZOLE 500 MG/1
500 TABLET ORAL 3 TIMES DAILY
Qty: 30 TABLET | Refills: 0 | Status: SHIPPED | OUTPATIENT
Start: 2024-03-20 | End: 2024-03-30

## 2024-03-21 ENCOUNTER — OFFICE VISIT (OUTPATIENT)
Dept: PRIMARY CARE | Facility: CLINIC | Age: 64
End: 2024-03-21
Payer: COMMERCIAL

## 2024-03-21 VITALS
HEART RATE: 88 BPM | DIASTOLIC BLOOD PRESSURE: 66 MMHG | SYSTOLIC BLOOD PRESSURE: 124 MMHG | BODY MASS INDEX: 45.92 KG/M2 | HEIGHT: 65 IN | WEIGHT: 275.6 LBS | OXYGEN SATURATION: 98 %

## 2024-03-21 DIAGNOSIS — E03.9 ACQUIRED HYPOTHYROIDISM: ICD-10-CM

## 2024-03-21 DIAGNOSIS — R10.13 EPIGASTRIC PAIN: ICD-10-CM

## 2024-03-21 DIAGNOSIS — R19.7 DIARRHEA, UNSPECIFIED TYPE: Primary | ICD-10-CM

## 2024-03-21 DIAGNOSIS — Z12.12 ENCOUNTER FOR COLORECTAL CANCER SCREENING: ICD-10-CM

## 2024-03-21 DIAGNOSIS — E78.2 MIXED HYPERLIPIDEMIA: ICD-10-CM

## 2024-03-21 DIAGNOSIS — E66.01 OBESITY, MORBID (MULTI): ICD-10-CM

## 2024-03-21 DIAGNOSIS — Z12.11 ENCOUNTER FOR COLORECTAL CANCER SCREENING: ICD-10-CM

## 2024-03-21 DIAGNOSIS — R14.2 BELCHING: ICD-10-CM

## 2024-03-21 PROCEDURE — 99214 OFFICE O/P EST MOD 30 MIN: CPT | Performed by: FAMILY MEDICINE

## 2024-03-21 RX ORDER — PANTOPRAZOLE SODIUM 40 MG/1
40 TABLET, DELAYED RELEASE ORAL DAILY
Qty: 30 TABLET | Refills: 1 | Status: SHIPPED | OUTPATIENT
Start: 2024-03-21 | End: 2024-04-12

## 2024-03-21 ASSESSMENT — ENCOUNTER SYMPTOMS
CONFUSION: 0
PHOTOPHOBIA: 0
FLANK PAIN: 0
DYSURIA: 0
SORE THROAT: 0
ADENOPATHY: 0
FATIGUE: 0
POLYPHAGIA: 0
SEIZURES: 0
EYE PAIN: 0
NERVOUS/ANXIOUS: 0
NECK STIFFNESS: 0
DIARRHEA: 1
STRIDOR: 0
AGITATION: 0
ABDOMINAL DISTENTION: 1
DECREASED CONCENTRATION: 0
DIZZINESS: 0
POLYDIPSIA: 0
ACTIVITY CHANGE: 0
ABDOMINAL PAIN: 0
CONSTIPATION: 0
PALPITATIONS: 0
HEADACHES: 0
DYSPHORIC MOOD: 0
SHORTNESS OF BREATH: 0
COLOR CHANGE: 0
BLOOD IN STOOL: 0
APPETITE CHANGE: 0
RECTAL PAIN: 0
CONSTITUTIONAL NEGATIVE: 1
FEVER: 0
SLEEP DISTURBANCE: 0
RHINORRHEA: 0
CHEST TIGHTNESS: 0
MYALGIAS: 0
SINUS PRESSURE: 0
HEMATURIA: 0
SINUS PAIN: 0
SPEECH DIFFICULTY: 0
TROUBLE SWALLOWING: 0
COUGH: 0
ARTHRALGIAS: 0

## 2024-03-21 NOTE — PROGRESS NOTES
"Subjective   Patient ID: Anastacia Bhandari is a 63 y.o. female who presents for Diarrhea.    Patient is here due to having abdominal ache. States she is not having pain. States he is experiencing diarrhea, states she has been taking the imodium and flagyl and is feeling her diarrhea is getting better and seems to be formed. Patient also states she has lost 5 pounds since onset of symptoms. Patient had a BM this morning and it was black/brown.     Denies any other concerns at this time.          Review of Systems   Constitutional: Negative.  Negative for activity change, appetite change, fatigue and fever.   HENT:  Negative for congestion, dental problem, ear discharge, ear pain, mouth sores, rhinorrhea, sinus pressure, sinus pain, sore throat, tinnitus and trouble swallowing.    Eyes:  Negative for photophobia, pain and visual disturbance.   Respiratory:  Negative for cough, chest tightness, shortness of breath and stridor.    Cardiovascular:  Negative for chest pain and palpitations.   Gastrointestinal:  Positive for abdominal distention and diarrhea. Negative for abdominal pain, blood in stool, constipation and rectal pain.        Abdominal discomfort   Belching    Endocrine: Negative for cold intolerance, heat intolerance, polydipsia, polyphagia and polyuria.   Genitourinary:  Negative for dysuria, flank pain, hematuria and urgency.   Musculoskeletal:  Negative for arthralgias, gait problem, myalgias and neck stiffness.   Skin:  Negative for color change and rash.   Allergic/Immunologic: Negative for environmental allergies and food allergies.   Neurological:  Negative for dizziness, seizures, syncope, speech difficulty and headaches.   Hematological:  Negative for adenopathy.   Psychiatric/Behavioral:  Negative for agitation, confusion, decreased concentration, dysphoric mood and sleep disturbance. The patient is not nervous/anxious.        Objective   /66   Pulse 88   Ht 1.651 m (5' 5\")   Wt 125 kg " (275 lb 9.6 oz)   SpO2 98%   BMI 45.86 kg/m²     Physical Exam  Vitals reviewed.   Constitutional:       General: She is not in acute distress.     Appearance: She is obese. She is not ill-appearing or diaphoretic.   HENT:      Head: Normocephalic.      Right Ear: Tympanic membrane and external ear normal.      Left Ear: Tympanic membrane and external ear normal.      Nose: Nose normal. No congestion.      Mouth/Throat:      Pharynx: No posterior oropharyngeal erythema.   Eyes:      General:         Right eye: No discharge.         Left eye: No discharge.      Extraocular Movements: Extraocular movements intact.      Conjunctiva/sclera: Conjunctivae normal.      Pupils: Pupils are equal, round, and reactive to light.   Cardiovascular:      Rate and Rhythm: Normal rate and regular rhythm.      Pulses: Normal pulses.      Heart sounds: Normal heart sounds. No murmur heard.  Pulmonary:      Effort: Pulmonary effort is normal. No respiratory distress.      Breath sounds: Normal breath sounds. No wheezing or rales.   Chest:      Chest wall: No tenderness.   Abdominal:      General: Bowel sounds are normal. There is distension.      Palpations: There is no mass.      Tenderness: There is abdominal tenderness. There is no guarding.   Musculoskeletal:         General: No tenderness. Normal range of motion.      Cervical back: Normal range of motion and neck supple. No tenderness.      Right lower leg: No edema.      Left lower leg: No edema.   Skin:     General: Skin is dry.      Coloration: Skin is not jaundiced.      Findings: No bruising, erythema or rash.   Neurological:      General: No focal deficit present.      Mental Status: She is alert and oriented to person, place, and time. Mental status is at baseline.      Cranial Nerves: No cranial nerve deficit.      Sensory: No sensory deficit.      Coordination: Coordination normal.      Gait: Gait normal.   Psychiatric:         Mood and Affect: Mood normal.          Thought Content: Thought content normal.         Judgment: Judgment normal.         Assessment/Plan   Problem List Items Addressed This Visit             ICD-10-CM    Acquired hypothyroidism E03.9    Relevant Orders    Thyroid Stimulating Hormone    Hyperlipidemia E78.5    Relevant Orders    CBC and Auto Differential    Comprehensive Metabolic Panel    Lipid Panel    Obesity, morbid (CMS/[facility]) E66.01     Other Visit Diagnoses         Codes    Diarrhea, unspecified type    -  Primary R19.7    Relevant Orders    CT abdomen pelvis wo IV contrast (Completed)    Amylase    Lipase    Epigastric pain     R10.13    Relevant Orders    CT abdomen pelvis wo IV contrast (Completed)    Amylase    Lipase    EGD    Encounter for colorectal cancer screening     Z12.11, Z12.12    Relevant Orders    Colonoscopy Screening; Average Risk Patient    Belching     R14.2    Relevant Medications    pantoprazole (ProtoNix) 40 mg EC tablet    Other Relevant Orders    EGD    BMI 45.0-49.9, adult (CMS/[facility])     Z68.42              Scribe Attestation  By signing my name below, IIlda RMA , Mattibe   attest that this documentation has been prepared under the direction and in the presence of Todd Sparrow DO.   Provider Attestation - Scribe documentation    All medical record entries made by the Scribe were at my direction and personally dictated by me. I have reviewed the chart and agree that the record accurately reflects my personal performance of the history, physical exam, discussion and plan.

## 2024-03-21 NOTE — PATIENT INSTRUCTIONS
Follow up in 7 days after CT scan     Continue current medications and therapy for chronic medical conditions.    Patient was advised importance of proper diet/nutrition in addition adequate hydration. Patient was encouraged moderate exercise program to include 30 minutes daily for 5 days of the week or 150 minutes weekly. Patient will follow-up with us as scheduled.    Obtain CT of abdomen and pelvis    Complete Colonoscopy/ EGD    Obtain Fasting Lipid, CMP, CBC, Amylase, lipase, and TSH     Start Pantoprazole 40mg once daily

## 2024-03-22 ENCOUNTER — HOSPITAL ENCOUNTER (OUTPATIENT)
Dept: RADIOLOGY | Facility: CLINIC | Age: 64
End: 2024-03-22
Payer: COMMERCIAL

## 2024-03-22 ENCOUNTER — HOSPITAL ENCOUNTER (OUTPATIENT)
Dept: RADIOLOGY | Facility: HOSPITAL | Age: 64
Discharge: HOME | End: 2024-03-22
Payer: COMMERCIAL

## 2024-03-22 DIAGNOSIS — R10.13 EPIGASTRIC PAIN: ICD-10-CM

## 2024-03-22 DIAGNOSIS — R19.7 DIARRHEA, UNSPECIFIED TYPE: ICD-10-CM

## 2024-03-22 PROCEDURE — 74176 CT ABD & PELVIS W/O CONTRAST: CPT

## 2024-03-22 PROCEDURE — 74176 CT ABD & PELVIS W/O CONTRAST: CPT | Performed by: RADIOLOGY

## 2024-03-24 ASSESSMENT — ENCOUNTER SYMPTOMS
HEMATURIA: 0
MYALGIAS: 0
ADENOPATHY: 0
ABDOMINAL PAIN: 0
CHEST TIGHTNESS: 0
TROUBLE SWALLOWING: 0
ARTHRALGIAS: 0
EYE PAIN: 0
PALPITATIONS: 0
DYSPHORIC MOOD: 0
ABDOMINAL DISTENTION: 1
BLOOD IN STOOL: 0
APPETITE CHANGE: 0
POLYDIPSIA: 0
RECTAL PAIN: 0
SHORTNESS OF BREATH: 0
FEVER: 0
CONSTIPATION: 0
AGITATION: 0
SEIZURES: 0
NERVOUS/ANXIOUS: 0
FATIGUE: 0
STRIDOR: 0
SORE THROAT: 0
SINUS PRESSURE: 0
COLOR CHANGE: 0
SPEECH DIFFICULTY: 0
DYSURIA: 0
DIZZINESS: 0
POLYPHAGIA: 0
NECK STIFFNESS: 0
DIARRHEA: 1
HEADACHES: 0
PHOTOPHOBIA: 0
RHINORRHEA: 0
CONSTITUTIONAL NEGATIVE: 1
SINUS PAIN: 0
COUGH: 0
FLANK PAIN: 0
ACTIVITY CHANGE: 0
DECREASED CONCENTRATION: 0
SLEEP DISTURBANCE: 0
CONFUSION: 0

## 2024-03-24 NOTE — PROGRESS NOTES
Subjective   Patient ID: Anastacia Bhandari is a 63 y.o. female who presents for Diarrhea.    Patient is here due to having abdominal ache. States she is not having pain. States he is experiencing diarrhea, states she has been taking the imodium and flagyl and is feeling her diarrhea is getting better and seems to be formed. Patient also states she has lost 5 pounds since onset of symptoms. Patient had a BM this morning and it was black/brown.     Denies any other concerns at this time.     Diarrhea   Pertinent negatives include no abdominal pain, arthralgias, coughing, fever, headaches or myalgias.        Review of Systems   Constitutional: Negative.  Negative for activity change, appetite change, fatigue and fever.   HENT:  Negative for congestion, dental problem, ear discharge, ear pain, mouth sores, rhinorrhea, sinus pressure, sinus pain, sore throat, tinnitus and trouble swallowing.    Eyes:  Negative for photophobia, pain and visual disturbance.   Respiratory:  Negative for cough, chest tightness, shortness of breath and stridor.    Cardiovascular:  Negative for chest pain and palpitations.   Gastrointestinal:  Positive for abdominal distention and diarrhea. Negative for abdominal pain, blood in stool, constipation and rectal pain.        Abdominal discomfort   Belching    Endocrine: Negative for cold intolerance, heat intolerance, polydipsia, polyphagia and polyuria.   Genitourinary:  Negative for dysuria, flank pain, hematuria and urgency.   Musculoskeletal:  Negative for arthralgias, gait problem, myalgias and neck stiffness.   Skin:  Negative for color change and rash.   Allergic/Immunologic: Negative for environmental allergies and food allergies.   Neurological:  Negative for dizziness, seizures, syncope, speech difficulty and headaches.   Hematological:  Negative for adenopathy.   Psychiatric/Behavioral:  Negative for agitation, confusion, decreased concentration, dysphoric mood and sleep disturbance.  "The patient is not nervous/anxious.        Objective   /66   Pulse 88   Ht 1.651 m (5' 5\")   Wt 125 kg (275 lb 9.6 oz)   SpO2 98%   BMI 45.86 kg/m²     Physical Exam  Vitals reviewed.   Constitutional:       General: She is not in acute distress.     Appearance: She is obese. She is not ill-appearing or diaphoretic.   HENT:      Head: Normocephalic.      Right Ear: Tympanic membrane and external ear normal.      Left Ear: Tympanic membrane and external ear normal.      Nose: Nose normal. No congestion.      Mouth/Throat:      Pharynx: No posterior oropharyngeal erythema.   Eyes:      General:         Right eye: No discharge.         Left eye: No discharge.      Extraocular Movements: Extraocular movements intact.      Conjunctiva/sclera: Conjunctivae normal.      Pupils: Pupils are equal, round, and reactive to light.   Cardiovascular:      Rate and Rhythm: Normal rate and regular rhythm.      Pulses: Normal pulses.      Heart sounds: Normal heart sounds. No murmur heard.  Pulmonary:      Effort: Pulmonary effort is normal. No respiratory distress.      Breath sounds: Normal breath sounds. No wheezing or rales.   Chest:      Chest wall: No tenderness.   Abdominal:      General: Bowel sounds are normal. There is distension.      Palpations: There is no mass.      Tenderness: There is abdominal tenderness. There is no guarding.   Musculoskeletal:         General: No tenderness. Normal range of motion.      Cervical back: Normal range of motion and neck supple. No tenderness.      Right lower leg: No edema.      Left lower leg: No edema.   Skin:     General: Skin is dry.      Coloration: Skin is not jaundiced.      Findings: No bruising, erythema or rash.   Neurological:      General: No focal deficit present.      Mental Status: She is alert and oriented to person, place, and time. Mental status is at baseline.      Cranial Nerves: No cranial nerve deficit.      Sensory: No sensory deficit.      " Coordination: Coordination normal.      Gait: Gait normal.   Psychiatric:         Mood and Affect: Mood normal.         Thought Content: Thought content normal.         Judgment: Judgment normal.         Assessment/Plan   Problem List Items Addressed This Visit             ICD-10-CM    Acquired hypothyroidism E03.9    Relevant Orders    Thyroid Stimulating Hormone    Hyperlipidemia E78.5    Relevant Orders    CBC and Auto Differential    Comprehensive Metabolic Panel    Lipid Panel    Obesity, morbid (CMS/[facility]) E66.01     Other Visit Diagnoses         Codes    Diarrhea, unspecified type    -  Primary R19.7    Relevant Orders    CT abdomen pelvis wo IV contrast (Completed)    Amylase    Lipase    Epigastric pain     R10.13    Relevant Orders    CT abdomen pelvis wo IV contrast (Completed)    Amylase    Lipase    EGD    Encounter for colorectal cancer screening     Z12.11, Z12.12    Relevant Orders    Colonoscopy Screening; Average Risk Patient    Belching     R14.2    Relevant Medications    pantoprazole (ProtoNix) 40 mg EC tablet    Other Relevant Orders    EGD    BMI 45.0-49.9, adult (CMS/[facility])     Z68.42              Scribe Attestation  By signing my name below, IIlda RMA , Leeroy   attest that this documentation has been prepared under the direction and in the presence of Todd Sparrow DO.   Provider Attestation - Scribe documentation    All medical record entries made by the Scribe were at my direction and personally dictated by me. I have reviewed the chart and agree that the record accurately reflects my personal performance of the history, physical exam, discussion and plan.

## 2024-03-25 ENCOUNTER — TELEPHONE (OUTPATIENT)
Dept: PRIMARY CARE | Facility: CLINIC | Age: 64
End: 2024-03-25
Payer: COMMERCIAL

## 2024-03-25 NOTE — TELEPHONE ENCOUNTER
----- Message from Todd Sparrow DO sent at 3/24/2024  7:54 PM EDT -----  Please check to see how patient is doing.  CT scan of the abdomen reviewed and will make sure diarrhea has cleared.  Thank you  ----- Message -----  From: Interface, Radiology Results In  Sent: 3/23/2024   4:59 PM EDT  To: Todd Sparrow DO

## 2024-03-26 DIAGNOSIS — Z12.11 COLON CANCER SCREENING: ICD-10-CM

## 2024-03-26 DIAGNOSIS — K44.9 HIATAL HERNIA: ICD-10-CM

## 2024-03-26 DIAGNOSIS — K65.4 MESENTERIC PANNICULITIS (MULTI): ICD-10-CM

## 2024-03-26 DIAGNOSIS — Z12.11 COLON CANCER SCREENING: Primary | ICD-10-CM

## 2024-03-26 DIAGNOSIS — E66.01 OBESITY, MORBID (MULTI): ICD-10-CM

## 2024-03-26 RX ORDER — POLYETHYLENE GLYCOL 3350, SODIUM CHLORIDE, SODIUM BICARBONATE, POTASSIUM CHLORIDE 420; 11.2; 5.72; 1.48 G/4L; G/4L; G/4L; G/4L
POWDER, FOR SOLUTION ORAL
Qty: 4000 ML | Refills: 0 | Status: SHIPPED | OUTPATIENT
Start: 2024-03-26 | End: 2024-05-15 | Stop reason: WASHOUT

## 2024-03-26 RX ORDER — SODIUM, POTASSIUM,MAG SULFATES 17.5-3.13G
SOLUTION, RECONSTITUTED, ORAL ORAL
Qty: 1 EACH | Refills: 0 | Status: SHIPPED | OUTPATIENT
Start: 2024-03-26 | End: 2024-05-15 | Stop reason: WASHOUT

## 2024-03-27 DIAGNOSIS — E66.01 OBESITY, MORBID (MULTI): ICD-10-CM

## 2024-03-27 RX ORDER — LIRAGLUTIDE 6 MG/ML
3 INJECTION, SOLUTION SUBCUTANEOUS DAILY
Qty: 24 ML | Refills: 2 | Status: SHIPPED | OUTPATIENT
Start: 2024-03-27 | End: 2024-05-15 | Stop reason: WASHOUT

## 2024-04-12 DIAGNOSIS — R14.2 BELCHING: ICD-10-CM

## 2024-04-12 RX ORDER — PANTOPRAZOLE SODIUM 40 MG/1
40 TABLET, DELAYED RELEASE ORAL DAILY
Qty: 90 TABLET | Refills: 1 | Status: SHIPPED | OUTPATIENT
Start: 2024-04-12

## 2024-04-15 DIAGNOSIS — E03.8 OTHER SPECIFIED HYPOTHYROIDISM: ICD-10-CM

## 2024-04-15 RX ORDER — LEVOTHYROXINE SODIUM 50 UG/1
50 TABLET ORAL DAILY
Qty: 90 TABLET | Refills: 1 | Status: SHIPPED | OUTPATIENT
Start: 2024-04-15

## 2024-04-17 ENCOUNTER — APPOINTMENT (OUTPATIENT)
Dept: GASTROENTEROLOGY | Facility: EXTERNAL LOCATION | Age: 64
End: 2024-04-17
Payer: COMMERCIAL

## 2024-04-18 ENCOUNTER — TELEPHONE (OUTPATIENT)
Dept: PRIMARY CARE | Facility: CLINIC | Age: 64
End: 2024-04-18
Payer: COMMERCIAL

## 2024-04-19 ENCOUNTER — TELEPHONE (OUTPATIENT)
Dept: PRIMARY CARE | Facility: CLINIC | Age: 64
End: 2024-04-19

## 2024-04-25 ENCOUNTER — APPOINTMENT (OUTPATIENT)
Dept: GASTROENTEROLOGY | Facility: EXTERNAL LOCATION | Age: 64
End: 2024-04-25
Payer: COMMERCIAL

## 2024-05-02 ENCOUNTER — APPOINTMENT (OUTPATIENT)
Dept: GASTROENTEROLOGY | Facility: EXTERNAL LOCATION | Age: 64
End: 2024-05-02
Payer: COMMERCIAL

## 2024-05-15 ENCOUNTER — OFFICE VISIT (OUTPATIENT)
Dept: PRIMARY CARE | Facility: CLINIC | Age: 64
End: 2024-05-15
Payer: COMMERCIAL

## 2024-05-15 VITALS
SYSTOLIC BLOOD PRESSURE: 126 MMHG | HEIGHT: 65 IN | HEART RATE: 71 BPM | RESPIRATION RATE: 18 BRPM | TEMPERATURE: 98.2 F | BODY MASS INDEX: 46.32 KG/M2 | DIASTOLIC BLOOD PRESSURE: 84 MMHG | WEIGHT: 278 LBS | OXYGEN SATURATION: 98 %

## 2024-05-15 DIAGNOSIS — E03.9 ACQUIRED HYPOTHYROIDISM: ICD-10-CM

## 2024-05-15 DIAGNOSIS — E66.01 OBESITY, MORBID (MULTI): ICD-10-CM

## 2024-05-15 DIAGNOSIS — E78.2 MIXED HYPERLIPIDEMIA: ICD-10-CM

## 2024-05-15 DIAGNOSIS — K04.7 ABSCESSED TOOTH: Primary | ICD-10-CM

## 2024-05-15 PROCEDURE — 99214 OFFICE O/P EST MOD 30 MIN: CPT | Performed by: FAMILY MEDICINE

## 2024-05-15 RX ORDER — LOPERAMIDE HYDROCHLORIDE 2 MG/1
CAPSULE ORAL
COMMUNITY
Start: 2024-03-20 | End: 2024-05-15 | Stop reason: WASHOUT

## 2024-05-15 RX ORDER — SEMAGLUTIDE 0.25 MG/.5ML
0.25 INJECTION, SOLUTION SUBCUTANEOUS
Qty: 2 ML | Refills: 0 | Status: SHIPPED | OUTPATIENT
Start: 2024-05-19 | End: 2024-06-10

## 2024-05-15 ASSESSMENT — ENCOUNTER SYMPTOMS
CHEST TIGHTNESS: 0
HEMATURIA: 0
MYALGIAS: 0
CONSTIPATION: 0
SEIZURES: 0
POLYDIPSIA: 0
FLANK PAIN: 0
TROUBLE SWALLOWING: 0
DIZZINESS: 0
DYSURIA: 0
AGITATION: 0
CONFUSION: 0
FATIGUE: 0
PHOTOPHOBIA: 0
ABDOMINAL DISTENTION: 0
POLYPHAGIA: 0
NERVOUS/ANXIOUS: 0
ABDOMINAL PAIN: 0
ADENOPATHY: 0
SINUS PAIN: 0
PALPITATIONS: 0
DECREASED CONCENTRATION: 0
RHINORRHEA: 0
APPETITE CHANGE: 0
HEADACHES: 0
NECK STIFFNESS: 0
DYSPHORIC MOOD: 0
ARTHRALGIAS: 0
SPEECH DIFFICULTY: 0
CONSTITUTIONAL NEGATIVE: 1
SLEEP DISTURBANCE: 0
SORE THROAT: 0
COUGH: 0
SHORTNESS OF BREATH: 0
RECTAL PAIN: 0
BLOOD IN STOOL: 0
ACTIVITY CHANGE: 0
EYE PAIN: 0
SINUS PRESSURE: 0
DIARRHEA: 0
FEVER: 0
STRIDOR: 0
COLOR CHANGE: 0

## 2024-05-15 NOTE — PROGRESS NOTES
Subjective   Patient ID: Anastacia Bhandari is a 63 y.o. female who presents for Earache.    Patient  was seen by Urgent Care on Saturday evening. Patient state she was diagnosed with right ear infection. Patient states was prescribed with amoxicillin. Patient states taking ibuprofen and acetaminophen for pain. States pain radiate into right jaw.  Still experiencing achy pain in the jaw and tenderness near broken tooth.     Patient would like to know if she need any vaccine for go to a cruise.    Patient denies any other symptoms or concerns today.    Earache   There is pain in the right ear. This is a recurrent problem. The current episode started in the past 7 days. The problem occurs constantly. The problem has been unchanged. There has been no fever. The pain is at a severity of 10/10. The pain is severe. Pertinent negatives include no abdominal pain, coughing, diarrhea, ear discharge, headaches, rash, rhinorrhea or sore throat. She has tried antibiotics, NSAIDs and acetaminophen for the symptoms. The treatment provided mild relief. Her past medical history is significant for a chronic ear infection.        Review of Systems   Constitutional: Negative.  Negative for activity change, appetite change, fatigue and fever.   HENT:  Positive for dental problem (tooth pain) and ear pain (right). Negative for congestion, ear discharge, mouth sores, rhinorrhea, sinus pressure, sinus pain, sore throat, tinnitus and trouble swallowing.    Eyes:  Negative for photophobia, pain and visual disturbance.   Respiratory:  Negative for cough, chest tightness, shortness of breath and stridor.    Cardiovascular:  Negative for chest pain and palpitations.   Gastrointestinal:  Negative for abdominal distention, abdominal pain, blood in stool, constipation, diarrhea and rectal pain.   Endocrine: Negative for cold intolerance, heat intolerance, polydipsia, polyphagia and polyuria.   Genitourinary:  Negative for dysuria, flank pain,  "hematuria and urgency.   Musculoskeletal:  Negative for arthralgias, gait problem, myalgias and neck stiffness.   Skin:  Negative for color change and rash.   Allergic/Immunologic: Negative for environmental allergies and food allergies.   Neurological:  Negative for dizziness, seizures, syncope, speech difficulty and headaches.   Hematological:  Negative for adenopathy.   Psychiatric/Behavioral:  Negative for agitation, confusion, decreased concentration, dysphoric mood and sleep disturbance. The patient is not nervous/anxious.        Objective   /84 (BP Location: Right arm, Patient Position: Sitting, BP Cuff Size: Large adult)   Pulse 71   Temp 36.8 °C (98.2 °F)   Resp 18   Ht 1.651 m (5' 5\")   Wt 126 kg (278 lb)   SpO2 98%   BMI 46.26 kg/m²     Physical Exam  Vitals reviewed.   Constitutional:       General: She is not in acute distress.     Appearance: She is obese. She is not ill-appearing or diaphoretic.   HENT:      Head: Normocephalic.      Right Ear: Tympanic membrane and external ear normal.      Left Ear: Tympanic membrane and external ear normal.      Nose: Nose normal. No congestion.      Mouth/Throat:      Pharynx: No posterior oropharyngeal erythema.   Eyes:      General:         Right eye: No discharge.         Left eye: No discharge.      Extraocular Movements: Extraocular movements intact.      Conjunctiva/sclera: Conjunctivae normal.      Pupils: Pupils are equal, round, and reactive to light.   Cardiovascular:      Rate and Rhythm: Normal rate and regular rhythm.      Pulses: Normal pulses.      Heart sounds: Normal heart sounds. No murmur heard.  Pulmonary:      Effort: Pulmonary effort is normal. No respiratory distress.      Breath sounds: Normal breath sounds. No wheezing or rales.   Chest:      Chest wall: No tenderness.   Abdominal:      General: Bowel sounds are normal. There is distension.      Palpations: There is no mass.      Tenderness: There is no abdominal tenderness. " There is no guarding.   Musculoskeletal:         General: No tenderness. Normal range of motion.      Cervical back: Normal range of motion and neck supple. No tenderness.      Right lower leg: No edema.      Left lower leg: No edema.   Skin:     General: Skin is dry.      Coloration: Skin is not jaundiced.      Findings: No bruising, erythema or rash.   Neurological:      General: No focal deficit present.      Mental Status: She is alert and oriented to person, place, and time. Mental status is at baseline.      Cranial Nerves: No cranial nerve deficit.      Sensory: No sensory deficit.      Coordination: Coordination normal.      Gait: Gait normal.   Psychiatric:         Mood and Affect: Mood normal.         Thought Content: Thought content normal.         Judgment: Judgment normal.         Assessment/Plan   Problem List Items Addressed This Visit             ICD-10-CM    Acquired hypothyroidism E03.9    Hyperlipidemia E78.5    Obesity, morbid (Multi) E66.01     Other Visit Diagnoses         Codes    Abscessed tooth    -  Primary K04.7    BMI 45.0-49.9, adult (Multi)     Z68.42    Relevant Medications    semaglutide, weight loss, (Wegovy) 0.25 mg/0.5 mL pen injector (Start on 5/19/2024)            Scribe Attestation  By signing my name below, I, Todd Sparrow DO , Scribe   attest that this documentation has been prepared under the direction and in the presence of Todd Sparrow DO.    Provider Attestation - Scribe documentation    All medical record entries made by the Scribe were at my direction and personally dictated by me. I have reviewed the chart and agree that the record accurately reflects my personal performance of the history, physical exam, discussion and plan.

## 2024-05-15 NOTE — PATIENT INSTRUCTIONS
Follow up with [facility]V in 4 weeks and regular OV in 3 MONTHS    Continue current medications and therapy for chronic medical conditions.    Patient was advised importance of proper diet/nutrition in addition adequate hydration. Patient was encouraged moderate exercise program to include 30 minutes daily for 5 days of the week or 150 minutes weekly. Patient will follow-up with us as scheduled.    REVIEW CT SCAN OF ABD/PELVIS from March 2024    OBTAIN LAB    Take amoxicillin 500 mg three times daily    Start wegovy 0.25 MGS WEEKLY

## 2024-06-07 ENCOUNTER — TELEPHONE (OUTPATIENT)
Dept: PRIMARY CARE | Facility: CLINIC | Age: 64
End: 2024-06-07
Payer: COMMERCIAL

## 2024-06-07 NOTE — TELEPHONE ENCOUNTER
DR CONTEH PT    PT PHONED OFFICE AND IS REQUESTING A CALL BACK. PT STATED SHE DID REACH OUT TO DAVID ON HIS PERSONAL PHONE.

## 2024-06-17 ENCOUNTER — APPOINTMENT (OUTPATIENT)
Dept: GASTROENTEROLOGY | Facility: CLINIC | Age: 64
End: 2024-06-17
Payer: COMMERCIAL

## 2024-06-17 ENCOUNTER — LAB (OUTPATIENT)
Dept: LAB | Facility: LAB | Age: 64
End: 2024-06-17
Payer: COMMERCIAL

## 2024-06-17 VITALS — WEIGHT: 276 LBS | BODY MASS INDEX: 45.98 KG/M2 | HEIGHT: 65 IN | HEART RATE: 64 BPM

## 2024-06-17 DIAGNOSIS — K21.9 GASTROESOPHAGEAL REFLUX DISEASE WITHOUT ESOPHAGITIS: ICD-10-CM

## 2024-06-17 DIAGNOSIS — E78.2 MIXED HYPERLIPIDEMIA: ICD-10-CM

## 2024-06-17 DIAGNOSIS — R10.13 EPIGASTRIC PAIN: ICD-10-CM

## 2024-06-17 DIAGNOSIS — E03.9 ACQUIRED HYPOTHYROIDISM: ICD-10-CM

## 2024-06-17 DIAGNOSIS — K44.9 HIATAL HERNIA: ICD-10-CM

## 2024-06-17 DIAGNOSIS — K65.4 MESENTERIC PANNICULITIS (MULTI): ICD-10-CM

## 2024-06-17 DIAGNOSIS — R19.7 DIARRHEA, UNSPECIFIED TYPE: ICD-10-CM

## 2024-06-17 DIAGNOSIS — Z12.11 COLON CANCER SCREENING: Primary | ICD-10-CM

## 2024-06-17 LAB
ALBUMIN SERPL BCP-MCNC: 4.2 G/DL (ref 3.4–5)
ALP SERPL-CCNC: 45 U/L (ref 33–136)
ALT SERPL W P-5'-P-CCNC: 23 U/L (ref 7–45)
AMYLASE SERPL-CCNC: 62 U/L (ref 29–103)
ANION GAP SERPL CALC-SCNC: 14 MMOL/L (ref 10–20)
AST SERPL W P-5'-P-CCNC: 24 U/L (ref 9–39)
BASOPHILS # BLD AUTO: 0.06 X10*3/UL (ref 0–0.1)
BASOPHILS NFR BLD AUTO: 0.9 %
BILIRUB SERPL-MCNC: 0.6 MG/DL (ref 0–1.2)
BUN SERPL-MCNC: 11 MG/DL (ref 6–23)
CALCIUM SERPL-MCNC: 9.6 MG/DL (ref 8.6–10.6)
CHLORIDE SERPL-SCNC: 104 MMOL/L (ref 98–107)
CHOLEST SERPL-MCNC: 201 MG/DL (ref 0–199)
CHOLESTEROL/HDL RATIO: 3
CO2 SERPL-SCNC: 26 MMOL/L (ref 21–32)
CREAT SERPL-MCNC: 0.81 MG/DL (ref 0.5–1.05)
EGFRCR SERPLBLD CKD-EPI 2021: 83 ML/MIN/1.73M*2
EOSINOPHIL # BLD AUTO: 0.13 X10*3/UL (ref 0–0.7)
EOSINOPHIL NFR BLD AUTO: 2 %
ERYTHROCYTE [DISTWIDTH] IN BLOOD BY AUTOMATED COUNT: 14.1 % (ref 11.5–14.5)
GLUCOSE SERPL-MCNC: 90 MG/DL (ref 74–99)
HCT VFR BLD AUTO: 42.4 % (ref 36–46)
HDLC SERPL-MCNC: 66.2 MG/DL
HGB BLD-MCNC: 13.6 G/DL (ref 12–16)
IMM GRANULOCYTES # BLD AUTO: 0.01 X10*3/UL (ref 0–0.7)
IMM GRANULOCYTES NFR BLD AUTO: 0.2 % (ref 0–0.9)
LDLC SERPL CALC-MCNC: 105 MG/DL
LIPASE SERPL-CCNC: 56 U/L (ref 9–82)
LYMPHOCYTES # BLD AUTO: 3.1 X10*3/UL (ref 1.2–4.8)
LYMPHOCYTES NFR BLD AUTO: 47.4 %
MCH RBC QN AUTO: 30.1 PG (ref 26–34)
MCHC RBC AUTO-ENTMCNC: 32.1 G/DL (ref 32–36)
MCV RBC AUTO: 94 FL (ref 80–100)
MONOCYTES # BLD AUTO: 0.46 X10*3/UL (ref 0.1–1)
MONOCYTES NFR BLD AUTO: 7 %
NEUTROPHILS # BLD AUTO: 2.78 X10*3/UL (ref 1.2–7.7)
NEUTROPHILS NFR BLD AUTO: 42.5 %
NON HDL CHOLESTEROL: 135 MG/DL (ref 0–149)
NRBC BLD-RTO: 0 /100 WBCS (ref 0–0)
PLATELET # BLD AUTO: 252 X10*3/UL (ref 150–450)
POTASSIUM SERPL-SCNC: 4.4 MMOL/L (ref 3.5–5.3)
PROT SERPL-MCNC: 7.1 G/DL (ref 6.4–8.2)
RBC # BLD AUTO: 4.52 X10*6/UL (ref 4–5.2)
SODIUM SERPL-SCNC: 140 MMOL/L (ref 136–145)
TRIGL SERPL-MCNC: 149 MG/DL (ref 0–149)
VLDL: 30 MG/DL (ref 0–40)
WBC # BLD AUTO: 6.5 X10*3/UL (ref 4.4–11.3)

## 2024-06-17 PROCEDURE — 80061 LIPID PANEL: CPT

## 2024-06-17 PROCEDURE — 83690 ASSAY OF LIPASE: CPT

## 2024-06-17 PROCEDURE — 80053 COMPREHEN METABOLIC PANEL: CPT

## 2024-06-17 PROCEDURE — 3008F BODY MASS INDEX DOCD: CPT | Performed by: INTERNAL MEDICINE

## 2024-06-17 PROCEDURE — 99204 OFFICE O/P NEW MOD 45 MIN: CPT | Performed by: INTERNAL MEDICINE

## 2024-06-17 PROCEDURE — 85025 COMPLETE CBC W/AUTO DIFF WBC: CPT

## 2024-06-17 PROCEDURE — 84443 ASSAY THYROID STIM HORMONE: CPT

## 2024-06-17 PROCEDURE — 1036F TOBACCO NON-USER: CPT | Performed by: INTERNAL MEDICINE

## 2024-06-17 PROCEDURE — 82150 ASSAY OF AMYLASE: CPT

## 2024-06-17 PROCEDURE — 36415 COLL VENOUS BLD VENIPUNCTURE: CPT

## 2024-06-17 RX ORDER — SODIUM PICOSULFATE, MAGNESIUM OXIDE, AND ANHYDROUS CITRIC ACID 12; 3.5; 1 G/175ML; G/175ML; MG/175ML
2 LIQUID ORAL ONCE
Qty: 350 ML | Refills: 0 | Status: SHIPPED | OUTPATIENT
Start: 2024-06-17 | End: 2024-06-17

## 2024-06-17 ASSESSMENT — ENCOUNTER SYMPTOMS
CARDIOVASCULAR NEGATIVE: 1
RESPIRATORY NEGATIVE: 1
CONSTITUTIONAL NEGATIVE: 1

## 2024-06-17 NOTE — PROGRESS NOTES
Subjective     History of Present Illness:     64yo with DL, hypothyroidism, morbid obesity seen for hiatal hernia, mesenteric panniculitis, screening colonoscopy.  Underwent CT scan 3/21/24 for abd pain and diarrhea, this showed increased small bowel mesenteric central density with associated mildly enlarged LN suggestive of mesenteric panniculitis; small hiatal hernia; small fat containing periumbilical hernia, colon appeared normal .    Some reflux symptoms and epigastric pain prompting CT per patient.   Given pantoprazole at that time and now pain is resolved.  Denies dysphagia/odynophagia.   Bms are one - 2 times per day, no straining. Mostly completely evacuated.  No blood or melena.  Denies diarrhea; may have had some loose stool in previously but does not recall.  No reflux or dysphagia.    Hepatitis C back in 2000 - treated for 6 months, and since cured.    Last colonoscopy had bad experience- woke up with LLQ pain during and after procedure, did not get to speak to surgeon after the exam.    Colonoscopy 2013- Diverticulosis, otherwise normal.     No family history of colon cancer.       Review of Systems  Review of Systems   Constitutional: Negative.    Respiratory: Negative.     Cardiovascular: Negative.    Genitourinary: Negative.        Past Medical History  Past Medical History:   Diagnosis Date    Body mass index (BMI) 45.0-49.9, adult (Multi) 05/27/2021    Body mass index (BMI) of 45.0 to 49.9 in adult    Disorder of thyroid, unspecified 05/27/2021    Thyroid dysfunction    Encounter for removal of sutures 11/26/2021    Visit for suture removal    Impacted cerumen, bilateral 06/24/2020    Bilateral impacted cerumen    Impacted cerumen, bilateral 05/30/2021    Bilateral impacted cerumen    Other chest pain 10/15/2019    Chest tightness    Other conditions influencing health status 10/15/2019    History of cough    Other obesity due to excess calories 07/19/2021    Exogenous obesity    Personal history  of other diseases of the female genital tract 07/19/2021    History of vaginal discharge    Personal history of other diseases of the respiratory system 10/15/2019    History of acute bronchitis    Personal history of other endocrine, nutritional and metabolic disease 06/21/2021    History of obesity       Social History  Social History     Tobacco Use    Smoking status: Never    Smokeless tobacco: Never   Substance Use Topics    Alcohol use: Not Currently    Drug use: Never       Family History  No family history on file.     Allergies  Allergies   Allergen Reactions    Bactrim [Sulfamethoxazole-Trimethoprim] Unknown    Metronidazole Hallucinations    Other Unknown    Sulfa (Sulfonamide Antibiotics) Unknown       Medications  Current Outpatient Medications   Medication Instructions    pantoprazole (PROTONIX) 40 mg, oral, Daily, DO NOT CRUSH CHEW OR SPLIT    Synthroid 50 mcg, oral, Daily    Wegovy 0.25 mg, subcutaneous, Once Weekly        Objective   There were no vitals taken for this visit.   Physical Exam  Deferred, asymptomatic         Assessment/Plan     64yo with DL, hypothyroidism, morbid obesity seen for hiatal hernia, mesenteric panniculiti, screening colonoscopy.  Hiatal hernia with sx of gerd now improved on PPI, previously with epigastric pain. Will check EGD to evaluate for esophagitis, pud , h pylori.  Due for screening colonoscpy and will also ensure no Ibd to account for possible mesenteric panniculitis on CT . Currently no sx to suggest Ibd.  Reviewed procedure, prep and risks. All questions answered.    Rec  Cont pantoprazole daily  Schedule EGD/colonoscopy      Lu Pond MD

## 2024-06-18 LAB — TSH SERPL-ACNC: 2.47 MIU/L (ref 0.44–3.98)

## 2024-06-24 ENCOUNTER — APPOINTMENT (OUTPATIENT)
Dept: GASTROENTEROLOGY | Facility: EXTERNAL LOCATION | Age: 64
End: 2024-06-24
Payer: COMMERCIAL

## 2024-06-24 DIAGNOSIS — K44.9 HIATAL HERNIA: ICD-10-CM

## 2024-06-24 DIAGNOSIS — R10.13 ABDOMINAL PAIN, EPIGASTRIC: Primary | ICD-10-CM

## 2024-06-24 DIAGNOSIS — K21.9 GASTROESOPHAGEAL REFLUX DISEASE WITHOUT ESOPHAGITIS: ICD-10-CM

## 2024-06-24 DIAGNOSIS — Z12.11 COLON CANCER SCREENING: ICD-10-CM

## 2024-06-24 DIAGNOSIS — K64.8 INTERNAL HEMORRHOIDS: ICD-10-CM

## 2024-06-24 DIAGNOSIS — K65.4 MESENTERIC PANNICULITIS (MULTI): ICD-10-CM

## 2024-06-24 DIAGNOSIS — D12.5 BENIGN NEOPLASM OF SIGMOID COLON: ICD-10-CM

## 2024-06-24 PROCEDURE — 88305 TISSUE EXAM BY PATHOLOGIST: CPT | Performed by: PATHOLOGY

## 2024-06-24 PROCEDURE — 3008F BODY MASS INDEX DOCD: CPT | Performed by: INTERNAL MEDICINE

## 2024-06-24 PROCEDURE — 0753T DGTZ GLS MCRSCP SLD LEVEL IV: CPT

## 2024-06-24 PROCEDURE — 45385 COLONOSCOPY W/LESION REMOVAL: CPT | Performed by: INTERNAL MEDICINE

## 2024-06-24 PROCEDURE — 43235 EGD DIAGNOSTIC BRUSH WASH: CPT | Performed by: INTERNAL MEDICINE

## 2024-06-24 PROCEDURE — 88305 TISSUE EXAM BY PATHOLOGIST: CPT

## 2024-06-26 ENCOUNTER — LAB REQUISITION (OUTPATIENT)
Dept: LAB | Facility: HOSPITAL | Age: 64
End: 2024-06-26
Payer: COMMERCIAL

## 2024-07-08 RX ORDER — SEMAGLUTIDE 0.25 MG/.5ML
0.25 INJECTION, SOLUTION SUBCUTANEOUS
Qty: 2 ML | Refills: 0 | Status: SHIPPED | OUTPATIENT
Start: 2024-07-14 | End: 2024-08-05

## 2024-07-10 LAB
LABORATORY COMMENT REPORT: NORMAL
PATH REPORT.FINAL DX SPEC: NORMAL
PATH REPORT.GROSS SPEC: NORMAL
PATH REPORT.RELEVANT HX SPEC: NORMAL
PATH REPORT.TOTAL CANCER: NORMAL

## 2024-07-18 ENCOUNTER — TELEPHONE (OUTPATIENT)
Dept: PRIMARY CARE | Facility: CLINIC | Age: 64
End: 2024-07-18
Payer: COMMERCIAL

## 2024-08-09 ENCOUNTER — APPOINTMENT (OUTPATIENT)
Dept: PRIMARY CARE | Facility: CLINIC | Age: 64
End: 2024-08-09
Payer: COMMERCIAL

## 2024-09-15 ENCOUNTER — OFFICE VISIT (OUTPATIENT)
Dept: URGENT CARE | Age: 64
End: 2024-09-15
Payer: COMMERCIAL

## 2024-09-15 VITALS
HEIGHT: 65 IN | RESPIRATION RATE: 16 BRPM | OXYGEN SATURATION: 95 % | DIASTOLIC BLOOD PRESSURE: 67 MMHG | BODY MASS INDEX: 44.98 KG/M2 | HEART RATE: 95 BPM | WEIGHT: 270 LBS | TEMPERATURE: 98.7 F | SYSTOLIC BLOOD PRESSURE: 124 MMHG

## 2024-09-15 DIAGNOSIS — J01.00 ACUTE NON-RECURRENT MAXILLARY SINUSITIS: Primary | ICD-10-CM

## 2024-09-15 DIAGNOSIS — J02.9 SORE THROAT: ICD-10-CM

## 2024-09-15 LAB
POC BINAX EXPIRATION: NORMAL
POC BINAX NOW COVID SERIAL NUMBER: NORMAL
POC RAPID STREP: NEGATIVE
POC SARS-COV-2 AG BINAX: NORMAL

## 2024-09-15 RX ORDER — AMOXICILLIN AND CLAVULANATE POTASSIUM 875; 125 MG/1; MG/1
1 TABLET, FILM COATED ORAL 2 TIMES DAILY
Qty: 20 TABLET | Refills: 0 | Status: SHIPPED | OUTPATIENT
Start: 2024-09-15 | End: 2024-09-25

## 2024-09-15 NOTE — PROGRESS NOTES
Subjective   Patient ID: Anastacia Bhandari is a 63 y.o. female. They present today with a chief complaint of Sore Throat and Nasal Congestion.    History of Present Illness  HPI  Patient is a 63-year-old female who presents with congestion and cough.  She states she is also had a sore throat over the past few days.  She is a  for school and is around kids all day.  She denies any fevers.  She has been trying over-the-counter medication without much relief.  Past Medical History  Allergies as of 09/15/2024 - Reviewed 09/15/2024   Allergen Reaction Noted    Bactrim [sulfamethoxazole-trimethoprim] Unknown 2023    Metronidazole Hallucinations 2024    Other Unknown 2023    Sulfa (sulfonamide antibiotics) Unknown 2023       (Not in a hospital admission)         Past Medical History:   Diagnosis Date    Body mass index (BMI) 45.0-49.9, adult (Multi) 2021    Body mass index (BMI) of 45.0 to 49.9 in adult    Disorder of thyroid, unspecified 2021    Thyroid dysfunction    Encounter for removal of sutures 2021    Visit for suture removal    Impacted cerumen, bilateral 2020    Bilateral impacted cerumen    Impacted cerumen, bilateral 2021    Bilateral impacted cerumen    Other chest pain 10/15/2019    Chest tightness    Other conditions influencing health status 10/15/2019    History of cough    Other obesity due to excess calories 2021    Exogenous obesity    Personal history of other diseases of the female genital tract 2021    History of vaginal discharge    Personal history of other diseases of the respiratory system 10/15/2019    History of acute bronchitis    Personal history of other endocrine, nutritional and metabolic disease 2021    History of obesity       Past Surgical History:   Procedure Laterality Date    OTHER SURGICAL HISTORY  2019     section        reports that she has never smoked. She has never used smokeless  "tobacco. She reports that she does not currently use alcohol. She reports that she does not use drugs.    Review of Systems  Review of Systems  CONSTITUTIONAL: No for fever, no chills, no recent weight loss + headache  EYES: No pain, No redness, No swelling  EARS: No discharge, No pain, No hearing loss  NOSE: + congestion, No discharge, No bleeding  MOUTH: No throat pain, No hoarseness  NECK: No pain, No stiffness, No swollen glands  CARDIOVASCULAR: No chest pain, No edema, No irregular rhythm, No palpitations  RESPIRATORY: No cough, No dyspnea  GI: No abdominal pain, No constipation or diarrhea, No N/V  : No urgency, No dysuria, No increase or decrease in urine output, No hematuria  MUSCULOSKELETAL: No back pain, No joint pain, No swelling, No weakness  SKIN: No rash, No lesions, No abrasions  NEURO: No dizziness, No alteration in mentation, No headache, No loss of consciousness, No ataxia  PSYCH: No anxiety, No depression, No SI or HI                             Objective    Vitals:    09/15/24 0830   BP: 124/67   Pulse: 95   Resp: 16   Temp: 37.1 °C (98.7 °F)   SpO2: 95%   Weight: 122 kg (270 lb)   Height: 1.651 m (5' 5\")     No LMP recorded.    Physical Exam  Gen.: Vitals noted no distress afebrile. Normal phonation, no stridor, no trismus.   ENT: TMs clear bilaterally, EACs unremarkable. Mastoids nontender. Posterior oropharynx without erythema, exudate, or swelling. Uvula is in the midline and nonedematous. No Loy's Angina. + rhinitis and maxillary sinus pressure on palpation  Neck: Supple. No meningismus through full range of motion. No lymphadenopathy.   Cardiac: Regular rate rhythm no murmur.   Lungs: Good aeration throughout. No adventitious breath sounds.   Abdomen: Soft nontender nonsurgical throughout. Normoactive bowel sounds.   Extremities: No peripheral edema, negative Homans bilaterally no cords.   Skin: No rash.   Neuro: No focal neurologic deficits. NIH score is 0.  Procedures    Point of " Care Test & Imaging Results from this visit  Results for orders placed or performed in visit on 09/15/24   POCT Covid-19 Rapid Antigen   Result Value Ref Range    Binax NOW Covid Serial Number neg     BINAX NOW Covid Expiration neg     POC GUNANR-COV-2 AG  Presumptive negative test for SARS-CoV-2 (no antigen detected)     Presumptive negative test for SARS-CoV-2 (no antigen detected)   POCT rapid strep A manually resulted   Result Value Ref Range    POC Rapid Strep Negative Negative     No results found.    Diagnostic study results (if any) were reviewed by SUMA Thorpe.    Assessment/Plan   Allergies, medications, history, and pertinent labs/EKGs/Imaging reviewed by SUMA Thorpe.     Medical Decision Making  History and physical is consistent with sinus infection.  Covid negative.  No signs of OM, OE, Strep.  Pt was prescribed abx and will  use flonase along with antihistamine and tylenol or motrin.  F/U with pcp    Orders and Diagnoses  Diagnoses and all orders for this visit:  Sore throat  -     POCT Covid-19 Rapid Antigen  -     POCT rapid strep A manually resulted        Medical Admin Record      Follow Up Instructions  No follow-ups on file.Pt was prescribed abx and will  use flonase along with antihistamine and tylenol or motrin.  F/U with pcp    Patient disposition:    Electronically signed by SUMA Thorpe  8:40 AM

## 2024-09-18 DIAGNOSIS — E03.8 OTHER SPECIFIED HYPOTHYROIDISM: ICD-10-CM

## 2024-09-18 RX ORDER — LEVOTHYROXINE SODIUM 50 UG/1
50 TABLET ORAL DAILY
Qty: 90 TABLET | Refills: 1 | Status: SHIPPED | OUTPATIENT
Start: 2024-09-18

## 2024-10-15 DIAGNOSIS — R14.2 BELCHING: ICD-10-CM

## 2024-10-15 RX ORDER — PANTOPRAZOLE SODIUM 40 MG/1
40 TABLET, DELAYED RELEASE ORAL DAILY
Qty: 90 TABLET | Refills: 1 | Status: SHIPPED | OUTPATIENT
Start: 2024-10-15

## 2024-12-18 ENCOUNTER — OFFICE VISIT (OUTPATIENT)
Dept: URGENT CARE | Age: 64
End: 2024-12-18
Payer: COMMERCIAL

## 2024-12-18 VITALS
SYSTOLIC BLOOD PRESSURE: 142 MMHG | HEART RATE: 64 BPM | RESPIRATION RATE: 16 BRPM | BODY MASS INDEX: 44.93 KG/M2 | WEIGHT: 270 LBS | DIASTOLIC BLOOD PRESSURE: 86 MMHG | OXYGEN SATURATION: 98 %

## 2024-12-18 DIAGNOSIS — K04.7 DENTAL INFECTION: Primary | ICD-10-CM

## 2024-12-18 PROCEDURE — 1036F TOBACCO NON-USER: CPT

## 2024-12-18 PROCEDURE — 99070 SPECIAL SUPPLIES PHYS/QHP: CPT

## 2024-12-18 PROCEDURE — 99213 OFFICE O/P EST LOW 20 MIN: CPT

## 2024-12-18 RX ORDER — AMOXICILLIN AND CLAVULANATE POTASSIUM 875; 125 MG/1; MG/1
1 TABLET, FILM COATED ORAL 2 TIMES DAILY
Qty: 20 TABLET | Refills: 0 | Status: SHIPPED | OUTPATIENT
Start: 2024-12-18 | End: 2024-12-28

## 2024-12-18 ASSESSMENT — ENCOUNTER SYMPTOMS
FACIAL SWELLING: 1
DIARRHEA: 0
RHINORRHEA: 0
WHEEZING: 0
PALPITATIONS: 0
SHORTNESS OF BREATH: 0
FEVER: 0
CHEST TIGHTNESS: 0
CHILLS: 0
SORE THROAT: 0
TROUBLE SWALLOWING: 0
NAUSEA: 0
CONSTIPATION: 0
COUGH: 0

## 2024-12-18 NOTE — PATIENT INSTRUCTIONS
Start antibiotic  Recommendation is ibuprofen    Follow up with dentist int he next week    If you develop fever go to ER.

## 2024-12-18 NOTE — PROGRESS NOTES
Subjective   Patient ID: Anastacia Bhandari is a 64 y.o. female. They present today with a chief complaint of Facial Pain.    History of Present Illness  Patient presents with left upper dental pain and facial pain that has worsened over the last two weeks. Explains that a few weeks ago she cracked a tooth and lost her filling. Has not seen dentist since incident. Denies discharge or drainage. Denies other symptoms including cough, congestion or other uri symptoms. Denies fever, chills, sweats. Denies n/v/d. Denies chest pain, sob.         Facial Pain  Associated symptoms: no chest pain, no congestion, no cough, no diarrhea, no ear pain, no fever, no nausea, no rash, no rhinorrhea, no shortness of breath, no sore throat and no wheezing        Past Medical History  Allergies as of 12/18/2024 - Reviewed 12/18/2024   Allergen Reaction Noted    Bactrim [sulfamethoxazole-trimethoprim] Unknown 07/11/2023    Metronidazole Hallucinations 03/26/2024    Other Unknown 07/11/2023    Sulfa (sulfonamide antibiotics) Unknown 07/11/2023       (Not in a hospital admission)       Past Medical History:   Diagnosis Date    Body mass index (BMI) 45.0-49.9, adult (Multi) 05/27/2021    Body mass index (BMI) of 45.0 to 49.9 in adult    Disorder of thyroid, unspecified 05/27/2021    Thyroid dysfunction    Encounter for removal of sutures 11/26/2021    Visit for suture removal    Impacted cerumen, bilateral 06/24/2020    Bilateral impacted cerumen    Impacted cerumen, bilateral 05/30/2021    Bilateral impacted cerumen    Other chest pain 10/15/2019    Chest tightness    Other conditions influencing health status 10/15/2019    History of cough    Other obesity due to excess calories 07/19/2021    Exogenous obesity    Personal history of other diseases of the female genital tract 07/19/2021    History of vaginal discharge    Personal history of other diseases of the respiratory system 10/15/2019    History of acute bronchitis    Personal  history of other endocrine, nutritional and metabolic disease 2021    History of obesity       Past Surgical History:   Procedure Laterality Date    OTHER SURGICAL HISTORY  2019     section        reports that she has never smoked. She has never used smokeless tobacco. She reports that she does not currently use alcohol. She reports that she does not use drugs.    Review of Systems  Review of Systems   Constitutional:  Negative for chills and fever.   HENT:  Positive for dental problem and facial swelling. Negative for congestion, ear pain, postnasal drip, rhinorrhea, sore throat and trouble swallowing.    Respiratory:  Negative for cough, chest tightness, shortness of breath and wheezing.    Cardiovascular:  Negative for chest pain and palpitations.   Gastrointestinal:  Negative for constipation, diarrhea and nausea.   Skin:  Negative for rash.                                  Objective    Vitals:    24 0927   BP: 142/86   Pulse: 64   Resp: 16   SpO2: 98%   Weight: 122 kg (270 lb)     No LMP recorded (lmp unknown). Patient is postmenopausal.    Physical Exam  Constitutional:       General: She is not in acute distress.     Appearance: She is not toxic-appearing.   HENT:      Head:      Comments: Erythema and mild facial swelling over left cheek     Right Ear: Tympanic membrane and external ear normal.      Left Ear: Tympanic membrane and external ear normal.      Nose: No congestion.      Right Sinus: No frontal sinus tenderness.      Left Sinus: No frontal sinus tenderness.      Mouth/Throat:      Mouth: Mucous membranes are moist. No oral lesions.      Dentition: Dental tenderness and gingival swelling present.      Pharynx: No oropharyngeal exudate, posterior oropharyngeal erythema or postnasal drip.      Comments: Tenderness to percussion over tooth number 14. Tooth 14 is chipped in half.   Eyes:      Pupils: Pupils are equal, round, and reactive to light.   Cardiovascular:      Rate  and Rhythm: Normal rate and regular rhythm.   Pulmonary:      Effort: Pulmonary effort is normal. No respiratory distress.      Breath sounds: Normal breath sounds. No wheezing.   Musculoskeletal:      Cervical back: Normal range of motion.   Neurological:      Mental Status: She is alert.         Procedures    Point of Care Test & Imaging Results from this visit  No results found for this visit on 12/18/24.   No results found.    Diagnostic study results (if any) were reviewed by Caron Lou PA-C.    Assessment/Plan   Allergies, medications, history, and pertinent labs/EKGs/Imaging reviewed by Caron Lou PA-C.     Medical Decision Making  MDM- Signs and symptoms consistent with dental infection. No evidence of severe abscess requiring drainage. Symptoms and exam are NOT suggestive of PTA or Ludwigs angina. Will cover with antibiotics and patient will follow with dentist for further evaluation and care. Supportive measures for pain. Advised to present to ED if symptoms change or worsen.  Patient verbalized understanding and agrees with plan.       Orders and Diagnoses  Diagnoses and all orders for this visit:  Dental infection  -     amoxicillin-pot clavulanate (Augmentin) 875-125 mg tablet; Take 1 tablet by mouth 2 times a day for 10 days.      Medical Admin Record      Patient disposition: Home    Electronically signed by Caron Lou PA-C  9:51 AM

## 2025-01-08 ENCOUNTER — TELEMEDICINE (OUTPATIENT)
Dept: PRIMARY CARE | Facility: CLINIC | Age: 65
End: 2025-01-08
Payer: COMMERCIAL

## 2025-01-08 ENCOUNTER — TELEPHONE (OUTPATIENT)
Dept: PRIMARY CARE | Facility: CLINIC | Age: 65
End: 2025-01-08

## 2025-01-08 ENCOUNTER — DOCUMENTATION (OUTPATIENT)
Dept: PRIMARY CARE | Facility: CLINIC | Age: 65
End: 2025-01-08

## 2025-01-08 VITALS — BODY MASS INDEX: 44.98 KG/M2 | WEIGHT: 270 LBS | HEIGHT: 65 IN

## 2025-01-08 DIAGNOSIS — J34.89 NASAL CONGESTION WITH RHINORRHEA: ICD-10-CM

## 2025-01-08 DIAGNOSIS — R05.9 COUGH IN ADULT PATIENT: ICD-10-CM

## 2025-01-08 DIAGNOSIS — R09.81 NASAL CONGESTION WITH RHINORRHEA: ICD-10-CM

## 2025-01-08 DIAGNOSIS — J00 NASOPHARYNGITIS: Primary | ICD-10-CM

## 2025-01-08 RX ORDER — AMOXICILLIN 875 MG/1
875 TABLET, FILM COATED ORAL 2 TIMES DAILY
Qty: 20 TABLET | Refills: 0 | Status: SHIPPED | OUTPATIENT
Start: 2025-01-08 | End: 2025-01-18

## 2025-01-08 RX ORDER — AMOXICILLIN 875 MG/1
875 TABLET, FILM COATED ORAL 2 TIMES DAILY
Qty: 14 TABLET | Refills: 0 | Status: SHIPPED | OUTPATIENT
Start: 2025-01-08 | End: 2025-01-08 | Stop reason: ENTERED-IN-ERROR

## 2025-01-08 ASSESSMENT — ENCOUNTER SYMPTOMS
OCCASIONAL FEELINGS OF UNSTEADINESS: 0
LOSS OF SENSATION IN FEET: 0
DEPRESSION: 0

## 2025-01-08 NOTE — PATIENT INSTRUCTIONS
DISCHARGE SUMMARY:   Patient was seen and examined. Diagnosis, treatment, treatment options, and possible complications of today's illness discussed and explained to patient. Patient to take medication/s associated with this visit. Patient may take OTC decongestant of choice as needed. Patient may also take OTC analgesic/ antipyretic if needed for pain/fever. Advised to increase oral fluid intake. Advised steam inhalation if needed to relieve congestion. Advised warm saline gargle if needed to relieve throat discomfort. Advised Listerine antiseptic mouthwash gargle TID. Patient may use Cepacol oral spray as needed to relieve throat discomfort. Patient was advised to discard the old toothbrush and use a new toothbrush beginning on the third of antibiotics. Advised to come back if with worsening or persistent symptoms. Patient verbalized understanding of plan of care.    Patient to come back in 7 - 10 days if needed for worsening symptoms.     Paramedian Forehead Flap Text: A decision was made to reconstruct the defect utilizing an interpolation axial flap and a staged reconstruction.  A telfa template was made of the defect.  This telfa template was then used to outline the paramedian forehead pedicle flap.  The donor area for the pedicle flap was then injected with anesthesia.  The flap was excised through the skin and subcutaneous tissue down to the layer of the underlying musculature.  The pedicle flap was carefully excised within this deep plane to maintain its blood supply.  The edges of the donor site were undermined.   The donor site was closed in a primary fashion.  The pedicle was then rotated into position and sutured.  Once the tube was sutured into place, adequate blood supply was confirmed with blanching and refill.  The pedicle was then wrapped with xeroform gauze and dressed appropriately with a telfa and gauze bandage to ensure continued blood supply and protect the attached pedicle.

## 2025-01-08 NOTE — PROGRESS NOTES
Virtual or Telephone Consent    A telephone visit (audio only) between the patient (at the originating site) and the provider (at the distant site) was utilized to provide this telehealth service.   Verbal consent was requested and obtained from Anastacia Bhandari on this date, 01/08/25 for a telehealth visit.     I have spent 19 minutes with this patient during this visit.  Telehealth visit (audio only) was done per patient's request.    Subjective   Patient ID: Anastacia Bhandari is a 64 y.o. female who is with a chief complaint of symptoms of respiratory tract infection.     HPI   Patient is a 64 y.o. female who CONSULTED AT [facility] VIRTUAL CLINIC - PHONE ONLY today. Patient is with complaints of nasal congestion, nasal discharge, headache, sinus pain, sore throat, post nasal drip, cough, intermittent shortness of breath, intermittent wheezing, chest congestion, fatigue, muscle ache, chills and fever. She denies having loss of sense of taste, loss of sense of smell, nor diarrhea. Patient states that present condition started about 4 days ago after being exposed to her cousins and aunties in [place]. she denies chest pain, palpitations, nor edema. she stated that she  tried OTC medications which afforded only slight relief of symptoms. she denies nausea, vomiting, abdominal pain, nor any other symptoms.    Patient states she had her COVID vaccine.  Patient states she had the flu shot for this season.    Review of Systems  General: no weight loss, generally healthy, (+) fatigue  Head: (+) headache, (+) sinus pain, no vertigo, no injury  Eyes: no diplopia, no tearing, no pain,   Ears: no change in hearing, no tinnitus, no bleeding, no vertigo  Mouth:  no dental difficulties, no gingival bleeding, (+) sore throat, no loss of sense of taste, (+) post nasal drip,   Nose: (+) congestion, (+) discharge, no bleeding, no obstruction, no loss of sense of smell  Neck: no stiffness, no pain, no tenderness, no masses,  no bruit  Pulmonary: (+) intermittent dyspnea, (+) intermittent wheezing, (+) chest congestion, no hemoptysis, (+) cough  Cardiovascular: no chest pain, no palpitations, no syncope, no orthopnea  Gastrointestinal: no change in appetite, no dysphagia, no abdominal pains, no diarrhea, no emesis, no melena  Genito Urinary: no dysuria, no urinary urgency, no nocturia, no incontinence, no change in nature of urine  Musculoskeletal: (+) muscle ache, no joint pain, no limitation of range of motion, no paresthesia, no numbness  Constitutional: (+) fever, (+) chills, no night sweats    Objective   NO VITAL SIGNS TAKEN FOR THIS PATIENT (VIRTUAL VISIT CONSULT - PHONE ONLY)    Physical Exam  PHYSICAL EXAMINATION WAS NOT DONE FOR THIS PATIENT (VIRTUAL VISIT CONSULT - PHONE ONLY)    Assessment/Plan   Problem List Items Addressed This Visit    None  Visit Diagnoses         Codes    Nasopharyngitis    -  Primary J00    Nasal congestion with rhinorrhea     R09.81, J34.89    Cough in adult patient     R05.9        DISCHARGE SUMMARY:   Patient was seen and examined. Diagnosis, treatment, treatment options, and possible complications of today's illness discussed and explained to patient. Patient to take medication/s associated with this visit. Patient may take OTC decongestant of choice as needed. Patient may also take OTC analgesic/ antipyretic if needed for pain/fever. Advised to increase oral fluid intake. Advised steam inhalation if needed to relieve congestion. Advised warm saline gargle if needed to relieve throat discomfort. Advised Listerine antiseptic mouthwash gargle TID. Patient may use Cepacol oral spray as needed to relieve throat discomfort. Patient was advised to discard the old toothbrush and use a new toothbrush beginning on the third of antibiotics. Advised to come back if with worsening or persistent symptoms. Patient verbalized understanding of plan of care.    Patient to come back in 7 - 10 days if needed for  worsening symptoms.  I have spent 19 minutes with this patient during this visit.  Telehealth visit (audio only) was done per patient's request.

## 2025-01-08 NOTE — TELEPHONE ENCOUNTER
[id number] - [place], [place] - [address]   Pt needs prescription for amoxicillin (Amoxil) 875 mg tablet to be resent to this pharmacy please.  Unable to  from pharmacy originally used due to insurance purposes.

## 2025-01-08 NOTE — PROGRESS NOTES
Subjective   Patient ID: Anastacia Bhandari is a 64 y.o. female who presents for URI.  Symptoms: congestion, body aches, phlegm, sinus congestion, chest congestion, chills, and Fever  Length of symptoms: Monday night 2 days ago   OTC: Mucinex and advil   Related information:  patient is wanting to RTW letter       HPI     Review of Systems    Objective   LMP  (LMP Unknown)     Physical Exam    Assessment/Plan

## 2025-02-17 DIAGNOSIS — M53.3 COCCYX PAIN: ICD-10-CM

## 2025-03-02 DIAGNOSIS — E03.8 OTHER SPECIFIED HYPOTHYROIDISM: ICD-10-CM

## 2025-03-03 RX ORDER — LEVOTHYROXINE SODIUM 50 UG/1
50 TABLET ORAL DAILY
Qty: 90 TABLET | Refills: 0 | Status: SHIPPED | OUTPATIENT
Start: 2025-03-03

## 2025-03-03 NOTE — TELEPHONE ENCOUNTER
Recent Visits  Date Type Provider Dept   05/15/24 Office Visit Todd Sparrow DO [facility]   03/21/24 Office Visit Todd Sparrow, DO [facility]   Showing recent visits within past 365 days and meeting all other requirements  Future Appointments  No visits were found meeting these conditions.  Showing future appointments within next 90 days and meeting all other requirements

## 2025-05-01 ENCOUNTER — TELEMEDICINE (OUTPATIENT)
Dept: PRIMARY CARE | Facility: CLINIC | Age: 65
End: 2025-05-01
Payer: COMMERCIAL

## 2025-05-01 DIAGNOSIS — J06.9 URI, ACUTE: Primary | ICD-10-CM

## 2025-05-01 DIAGNOSIS — R73.9 HYPERGLYCEMIA: ICD-10-CM

## 2025-05-01 DIAGNOSIS — E66.01 MORBID OBESITY (MULTI): ICD-10-CM

## 2025-05-01 DIAGNOSIS — R14.2 BELCHING: ICD-10-CM

## 2025-05-01 PROCEDURE — 98013 SYNCH AUDIO-ONLY EST LOW 20: CPT | Performed by: FAMILY MEDICINE

## 2025-05-01 RX ORDER — BENZONATATE 100 MG/1
100-200 CAPSULE ORAL 3 TIMES DAILY PRN
Qty: 60 CAPSULE | Refills: 1 | Status: SHIPPED | OUTPATIENT
Start: 2025-05-01 | End: 2025-05-02

## 2025-05-01 RX ORDER — PANTOPRAZOLE SODIUM 40 MG/1
40 TABLET, DELAYED RELEASE ORAL DAILY
Qty: 90 TABLET | Refills: 1 | Status: SHIPPED | OUTPATIENT
Start: 2025-05-01 | End: 2025-05-02

## 2025-05-01 RX ORDER — AZITHROMYCIN 250 MG/1
TABLET, FILM COATED ORAL
Qty: 6 TABLET | Refills: 0 | Status: SHIPPED | OUTPATIENT
Start: 2025-05-01 | End: 2025-05-06

## 2025-05-01 RX ORDER — AZITHROMYCIN 250 MG/1
TABLET, FILM COATED ORAL
Qty: 6 TABLET | Refills: 0 | Status: SHIPPED | OUTPATIENT
Start: 2025-05-01 | End: 2025-05-02 | Stop reason: SDUPTHER

## 2025-05-01 NOTE — PROGRESS NOTES
Subjective   Patient ID: Anastacia Bhandari is a 64 y.o. female who presents for Med Refill.      Virtual or Telephone Consent    While technically available, the patient was unable or unwilling to consent to connect via audio/video telehealth technology; therefore, I performed this visit using a real-time audio only connection between Anastacia Bhandari & Todd Sparrow DO.  Verbal consent was requested and obtained from Anastacia Bhandari on this date, 05/01/25 for a telehealth visit and the patient's location was confirmed at the time of the visit.     History of Present Illness  Anastacia Bhandari is a 64 year old female who presents with a lingering cough and shortness of breath following a recent upper respiratory infection.    She has been experiencing a lingering cough and shortness of breath following a recent upper respiratory infection that began approximately two weeks ago. The illness initially started with her son, Josué, who developed a chest cold with fever and congestion, which then spread to her and her mother. She describes her cough as a 'dry hacky cough' and notes that she is over the stuffiness and fever, but the cough persists, especially in the morning or when she is active. She also experiences crackling sounds in her chest when lying down at night.    During the height of the illness, she experienced yellow phlegm, but this has since improved. She had a fever for two nights, which has resolved. Despite improvement in congestion, she continues to experience shortness of breath and a persistent cough that affects her daily activities.    She is currently taking pantoprazole 40 mg, which she prefers to receive through her mail-order pharmacy, [facility], to ensure it counts towards her insurance. She also takes a thyroid medication, which is delivered in the same manner.    She is frustrated with her weight management efforts, noting that previous medications were not approved by her insurance.  She is interested in exploring new options for weight loss, as she has not received follow-up from the office regarding this issue for over a year.    Review of Systems   Constitutional: Negative.  Negative for activity change, appetite change, fatigue and fever.   HENT:  Positive for congestion, rhinorrhea and sinus pain. Negative for dental problem, ear discharge, ear pain, mouth sores, sinus pressure, sore throat, tinnitus and trouble swallowing.    Eyes:  Negative for photophobia, pain and visual disturbance.   Respiratory:  Negative for cough, chest tightness, shortness of breath and stridor.    Cardiovascular:  Negative for chest pain and palpitations.   Gastrointestinal:  Negative for abdominal distention, abdominal pain, blood in stool, constipation, diarrhea and rectal pain.   Endocrine: Negative for cold intolerance, heat intolerance, polydipsia, polyphagia and polyuria.   Genitourinary:  Negative for dysuria, flank pain, hematuria and urgency.   Musculoskeletal:  Negative for arthralgias, gait problem, myalgias and neck stiffness.   Skin:  Negative for color change and rash.   Allergic/Immunologic: Negative for environmental allergies and food allergies.   Neurological:  Negative for dizziness, seizures, syncope, speech difficulty and headaches.   Hematological:  Negative for adenopathy.   Psychiatric/Behavioral:  Negative for agitation, confusion, decreased concentration, dysphoric mood and sleep disturbance. The patient is nervous/anxious.        Objective     LMP  (LMP Unknown)      Physical Exam  Oriented x 3.  Mood and affect appropriate  Obese/BMI 44.9  Problem List Items Addressed This Visit    None       Assessment & Plan  Cough and shortness of breath  Persistent dry, hacky cough with chest congestion and shortness of breath following a chest cold. No fever, but crackles present on auscultation. Cough worsens with activity and in the morning. Previously had yellow phlegm. Shortness of breath and  wheezing, especially nocturnal.  - Prescribe Z-Balwinder for cough and chest congestion.  - Prescribe Tessalon Perles for cough suppression.  - Advise adequate fluid intake and rest.    Gastroesophageal reflux disease (GERD)  GERD managed with pantoprazole, prefers medication delivery through [facility] mail service for insurance coverage.  - Send pantoprazole 40 mg prescription to [facility] mail service.    Obesity  Frustrated with previous weight loss medications, interested in Rybelsus, an oral GLP-1 receptor agonist. Discussed its benefits for satiety and diabetes management, potential side effects include stomach upset and diarrhea, generally well-tolerated. Willing to try Rybelsus, pending insurance coverage. Taken once daily, 15-20 minutes before breakfast, suitable for long-term use if effective.  - Prescribe Rybelsus 3 mg to be taken once daily, 15-20 minutes before breakfast.  - Follow up in two months to assess effectiveness and discuss potential dose adjustment.    Todd Sparrow, DO     This medical note was created with the assistance of artificial intelligence (AI) for documentation purposes. The content has been reviewed and confirmed by the healthcare provider for accuracy and completeness. Patient consented to the use of audio recording and use of AI during their visit.

## 2025-05-04 RX ORDER — AZITHROMYCIN 250 MG/1
TABLET, FILM COATED ORAL
Qty: 6 TABLET | Refills: 0 | Status: SHIPPED | OUTPATIENT
Start: 2025-05-04 | End: 2025-05-09

## 2025-05-04 RX ORDER — PANTOPRAZOLE SODIUM 40 MG/1
40 TABLET, DELAYED RELEASE ORAL DAILY
Qty: 90 TABLET | Refills: 1 | Status: SHIPPED | OUTPATIENT
Start: 2025-05-04

## 2025-05-04 RX ORDER — BENZONATATE 100 MG/1
100-200 CAPSULE ORAL 3 TIMES DAILY PRN
Qty: 60 CAPSULE | Refills: 1 | Status: SHIPPED | OUTPATIENT
Start: 2025-05-04 | End: 2025-06-03

## 2025-05-04 ASSESSMENT — ENCOUNTER SYMPTOMS
EYE PAIN: 0
PALPITATIONS: 0
COUGH: 0
RHINORRHEA: 1
SINUS PRESSURE: 0
AGITATION: 0
NECK STIFFNESS: 0
SINUS PAIN: 1
SHORTNESS OF BREATH: 0
STRIDOR: 0
HEMATURIA: 0
CONSTITUTIONAL NEGATIVE: 1
DECREASED CONCENTRATION: 0
POLYDIPSIA: 0
DYSPHORIC MOOD: 0
ABDOMINAL PAIN: 0
FEVER: 0
HEADACHES: 0
COLOR CHANGE: 0
RECTAL PAIN: 0
CONSTIPATION: 0
PHOTOPHOBIA: 0
FATIGUE: 0
SLEEP DISTURBANCE: 0
ABDOMINAL DISTENTION: 0
ACTIVITY CHANGE: 0
DYSURIA: 0
POLYPHAGIA: 0
APPETITE CHANGE: 0
ARTHRALGIAS: 0
DIZZINESS: 0
SEIZURES: 0
CONFUSION: 0
TROUBLE SWALLOWING: 0
CHEST TIGHTNESS: 0
FLANK PAIN: 0
DIARRHEA: 0
SORE THROAT: 0
MYALGIAS: 0
BLOOD IN STOOL: 0
SPEECH DIFFICULTY: 0
NERVOUS/ANXIOUS: 1
ADENOPATHY: 0

## 2025-05-05 DIAGNOSIS — R73.9 HYPERGLYCEMIA: ICD-10-CM

## 2025-05-05 NOTE — PATIENT INSTRUCTIONS
VISIT SUMMARY:  Today, we addressed your lingering cough and shortness of breath following a recent upper respiratory infection, managed your GERD, and discussed new options for weight loss.    YOUR PLAN:  -COUGH AND SHORTNESS OF BREATH: You have a persistent dry cough and shortness of breath following a recent chest cold. We will treat this with a Z-Balwinder for the congestion and Tessalon Perles to help suppress the cough. Make sure to drink plenty of fluids and get adequate rest.    -GASTROESOPHAGEAL REFLUX DISEASE (GERD): GERD is a condition where stomach acid frequently flows back into the tube connecting your mouth and stomach. We will continue managing this with pantoprazole 40 mg, which will be sent to your [employer] mail service for insurance coverage.    -OBESITY: We discussed your frustration with previous weight loss medications and decided to try Rybelsus, which can help with satiety and diabetes management. It is taken once daily, 15-20 minutes before breakfast. We will follow up in two months to see how it is working and discuss any necessary dose adjustments.    INSTRUCTIONS:  Please follow up in two months to assess the effectiveness of Rybelsus and discuss any potential dose adjustments. Make sure to take the medication as directed and monitor for any side effects.

## 2025-05-05 NOTE — TELEPHONE ENCOUNTER
Pt needs script sent over to local pharmacy so insurance will pay for it.     semaglutide (Rybelsus) 3 mg tablet     Pharmacy    [id number] - [place], [place] - [address]  [address], [facility] [address]  Phone: [phone]  Fax: [phone]

## 2025-05-20 ENCOUNTER — TELEPHONE (OUTPATIENT)
Dept: PRIMARY CARE | Facility: CLINIC | Age: 65
End: 2025-05-20

## 2025-06-17 DIAGNOSIS — E03.8 OTHER SPECIFIED HYPOTHYROIDISM: ICD-10-CM

## 2025-06-17 RX ORDER — LEVOTHYROXINE SODIUM 50 UG/1
50 TABLET ORAL DAILY
Qty: 90 TABLET | Refills: 0 | Status: SHIPPED | OUTPATIENT
Start: 2025-06-17

## 2025-07-09 NOTE — PATIENT INSTRUCTIONS
You are diagnosed with sinusitis, take over-the-counter Claritin or Zyrtec daily along with Flonase nasal spray. You may take Tylenol Motrin for any aches and pains. Antibiotics were called into your pharmacy take as directed. Follow-up with your primary care doctor in the outpatient basis.  
bed mobility training/gait training/transfer training

## 2025-07-17 ENCOUNTER — APPOINTMENT (OUTPATIENT)
Dept: PRIMARY CARE | Facility: CLINIC | Age: 65
End: 2025-07-17
Payer: COMMERCIAL

## 2025-07-17 VITALS
SYSTOLIC BLOOD PRESSURE: 128 MMHG | HEART RATE: 79 BPM | HEIGHT: 65 IN | BODY MASS INDEX: 45.62 KG/M2 | TEMPERATURE: 98 F | OXYGEN SATURATION: 96 % | WEIGHT: 273.8 LBS | DIASTOLIC BLOOD PRESSURE: 72 MMHG

## 2025-07-17 DIAGNOSIS — R73.9 HYPERGLYCEMIA: ICD-10-CM

## 2025-07-17 DIAGNOSIS — E03.9 ACQUIRED HYPOTHYROIDISM: Primary | ICD-10-CM

## 2025-07-17 DIAGNOSIS — Z12.31 ENCOUNTER FOR SCREENING MAMMOGRAM FOR BREAST CANCER: ICD-10-CM

## 2025-07-17 DIAGNOSIS — E78.2 MIXED HYPERLIPIDEMIA: ICD-10-CM

## 2025-07-17 DIAGNOSIS — R53.83 OTHER FATIGUE: ICD-10-CM

## 2025-07-17 DIAGNOSIS — E03.8 OTHER SPECIFIED HYPOTHYROIDISM: ICD-10-CM

## 2025-07-17 DIAGNOSIS — E55.9 VITAMIN D DEFICIENCY: ICD-10-CM

## 2025-07-17 PROCEDURE — 99213 OFFICE O/P EST LOW 20 MIN: CPT | Performed by: FAMILY MEDICINE

## 2025-07-17 RX ORDER — LEVOTHYROXINE SODIUM 50 UG/1
50 TABLET ORAL DAILY
Qty: 90 TABLET | Refills: 0 | Status: SHIPPED | OUTPATIENT
Start: 2025-07-17

## 2025-07-17 NOTE — PROGRESS NOTES
Subjective   Patient ID: Anastacia Bhandari is a 64 y.o. female who presents for Hypothyroidism (Follow up for hypothyroidism. ).  History of Present Illness  The patient is a 64-year-old  female who comes in for follow-up on hypothyroidism. She takes levothyroxine at 50 mcg daily.    She has been experiencing significant weight loss, now weighing 113 pounds. This weight loss is attributed to her sedentary lifestyle, as she spends most of her time sitting. She has gained back 19 pounds of the 35 pounds she had previously lost. She was considering Wegovy for weight loss but has decided against it due to concerns about potential side effects such as vomiting and diarrhea. Instead, she plans to increase her physical activity by using her treadmill more frequently.    She has a history of back issues related to a disc problem. Her physical activity is limited due to severe back pain. On 07/04/2025, she fell while attempting to close a window, landing on her tailbone. This incident exacerbated her back pain, causing spasms and discomfort. However, she reports feeling better in recent days.    She is currently taking pantoprazole and Synthroid.    She recently got new glasses at the beginning of the year. She is due for a mammogram but is reluctant to undergo the procedure. She had a colonoscopy two years ago and was advised that she would not need another one for 1.5 years.    Review of Systems   Constitutional: Negative.  Negative for activity change, appetite change, fatigue and fever.   HENT:  Negative for congestion, dental problem, ear discharge, ear pain, mouth sores, rhinorrhea, sinus pressure, sinus pain, sore throat, tinnitus and trouble swallowing.    Eyes:  Negative for photophobia, pain and visual disturbance.   Respiratory:  Negative for cough, chest tightness, shortness of breath and stridor.    Cardiovascular:  Negative for chest pain and palpitations.   Gastrointestinal:  Negative for abdominal  "distention, abdominal pain, blood in stool, constipation, diarrhea and rectal pain.   Endocrine: Negative for cold intolerance, heat intolerance, polydipsia, polyphagia and polyuria.   Genitourinary:  Negative for dysuria, flank pain, hematuria and urgency.   Musculoskeletal:  Negative for arthralgias, gait problem, myalgias and neck stiffness.   Skin:  Negative for color change and rash.   Allergic/Immunologic: Negative for environmental allergies and food allergies.   Neurological:  Negative for dizziness, seizures, syncope, speech difficulty and headaches.   Hematological:  Negative for adenopathy.   Psychiatric/Behavioral:  Negative for agitation, confusion, decreased concentration, dysphoric mood and sleep disturbance. The patient is not nervous/anxious.    The above were reviewed and noted negative except as noted in HPI and Problem List.    Objective     /72   Pulse 79   Temp 36.7 °C (98 °F) (Temporal)   Ht 1.651 m (5' 5\")   Wt 124 kg (273 lb 12.8 oz)   LMP  (LMP Unknown)   SpO2 96%   BMI 45.56 kg/m²      Physical Exam  Vital Signs  Blood pressure is 128/72.  Constitutional: Well developed, well nourished, alert and in no acute distress   Eyes: Normal external exam. Pupils equally round and reactive to light with normal accommodation and extraocular movements intact.  Neck: Supple, no lymphadenopathy or masses.   Cardiovascular: Regular rate and rhythm, normal S1 and S2, no murmurs, gallops, or rubs. Radial pulses normal. No peripheral edema.  Pulmonary: No respiratory distress, lungs clear to auscultation bilaterally. No wheezes, rhonchi, rales.  Abdomen: soft,non tender, non distended, without masses or HSM  Skin: Warm, well perfused, normal skin turgor and color.   Neurologic: Cranial nerves II-XII grossly intact.   Psychiatric: Mood calm and affect normal  Musculoskeletal: Moving all extremities without restriction  The above were reviewed and noted negative except as noted in HPI and Problem " List.    Problem List Items Addressed This Visit       Acquired hypothyroidism - Primary    Relevant Medications    levothyroxine (Synthroid) 50 mcg tablet    Fatigue    Relevant Orders    CBC and Auto Differential    Hyperlipidemia    Relevant Orders    Lipid Panel    Comprehensive Metabolic Panel    Vitamin D deficiency    Relevant Orders    Vitamin D 25-Hydroxy,Total (for eval of Vitamin D levels)     Other Visit Diagnoses         Other specified hypothyroidism        Relevant Medications    levothyroxine (Synthroid) 50 mcg tablet    Other Relevant Orders    Thyroid Stimulating Hormone      Encounter for screening mammogram for breast cancer        Relevant Orders    BI mammo bilateral screening tomosynthesis      Hyperglycemia                 Assessment & Plan  1. Hypothyroidism.  - TSH level from 06/17/2024 was 2.47, indicating stable thyroid function.  - Currently taking levothyroxine 50 mcg daily.  - Lab order placed to monitor thyroid levels for this year.  - Continues to take pantoprazole and Synthroid.    2. Weight management.  - Significant weight loss, now weighing 113 pounds.  - Prefers treadmill exercise over weight loss injections like Wegovy due to concerns about side effects.  - Previously lost 35 pounds through increased walking.  - Plans to resume treadmill use for weight management.    3. Back pain.  - History of back problems, recently fell on 07/04/2025 causing tailbone pain and spasms.  - Advised to avoid strenuous activities until symptoms improve.  - Reports improvement in pain and reduction in spasms over the last few days.  - Continues to experience discomfort, particularly with back issues.    4. Health maintenance.  - Cholesterol levels from 06/2024: total cholesterol 201, HDL 66, .  - Blood glucose level was 90, electrolytes normal (sodium 140, potassium 4.4).  - Kidney function (GFR 83) and liver function tests normal.  - Mammogram ordered for routine screening.  - Vision test  completed in fall of 2024, new glasses obtained at the beginning of 2025.    Results  Labs   - TSH: 06/17/2024, 2.47   - Cholesterol: 06/2024, 201   - HDL: 06/2024, 66   - LDL: 06/2024, 105   - Blood sugar: 90   - Sodium: 140   - Potassium: 4.4   - GFR: 83       Todd Sparrow,      This medical note was created with the assistance of artificial intelligence (AI) for documentation purposes. The content has been reviewed and confirmed by the healthcare provider for accuracy and completeness. Patient consented to the use of audio recording and use of AI during their visit.

## 2025-07-25 DIAGNOSIS — R14.2 BELCHING: ICD-10-CM

## 2025-07-25 NOTE — TELEPHONE ENCOUNTER
Recent Visits  Date Type Provider Dept   07/17/25 Office Visit Todd Sparrow, DO [facility]   Showing recent visits within past 180 days and meeting all other requirements  Future Appointments  Date Type Provider Dept   10/20/25 Appointment Todd Sparrow DO [facility]   Showing future appointments within next 90 days and meeting all other requirements       Yes

## 2025-07-25 NOTE — TELEPHONE ENCOUNTER
pantoprazole (ProtoNix) 40 mg EC tablet     Pharmacy    [facility] - [facility] AT [facility]  [facility] [place], [place] [address]  Phone: [phone]  Fax: [phone]

## 2025-07-26 RX ORDER — PANTOPRAZOLE SODIUM 40 MG/1
40 TABLET, DELAYED RELEASE ORAL DAILY
Qty: 90 TABLET | Refills: 1 | Status: SHIPPED | OUTPATIENT
Start: 2025-07-26

## 2025-08-13 LAB
25(OH)D3+25(OH)D2 SERPL-MCNC: 32 NG/ML (ref 30–100)
ALBUMIN SERPL-MCNC: 4.3 G/DL (ref 3.6–5.1)
ALP SERPL-CCNC: 42 U/L (ref 37–153)
ALT SERPL-CCNC: 21 U/L (ref 6–29)
ANION GAP SERPL CALCULATED.4IONS-SCNC: 9 MMOL/L (CALC) (ref 7–17)
AST SERPL-CCNC: 20 U/L (ref 10–35)
BASOPHILS # BLD AUTO: 39 CELLS/UL (ref 0–200)
BASOPHILS NFR BLD AUTO: 0.6 %
BILIRUB SERPL-MCNC: 0.4 MG/DL (ref 0.2–1.2)
BUN SERPL-MCNC: 14 MG/DL (ref 7–25)
CALCIUM SERPL-MCNC: 9.5 MG/DL (ref 8.6–10.4)
CHLORIDE SERPL-SCNC: 106 MMOL/L (ref 98–110)
CHOLEST SERPL-MCNC: 200 MG/DL
CHOLEST/HDLC SERPL: 3.1 (CALC)
CO2 SERPL-SCNC: 27 MMOL/L (ref 20–32)
CREAT SERPL-MCNC: 0.76 MG/DL (ref 0.5–1.05)
EGFRCR SERPLBLD CKD-EPI 2021: 89 ML/MIN/1.73M2
EOSINOPHIL # BLD AUTO: 98 CELLS/UL (ref 15–500)
EOSINOPHIL NFR BLD AUTO: 1.5 %
ERYTHROCYTE [DISTWIDTH] IN BLOOD BY AUTOMATED COUNT: 14.1 % (ref 11–15)
GLUCOSE SERPL-MCNC: 95 MG/DL (ref 65–99)
HCT VFR BLD AUTO: 41.5 % (ref 35–45)
HDLC SERPL-MCNC: 65 MG/DL
HGB BLD-MCNC: 13.2 G/DL (ref 11.7–15.5)
LDLC SERPL CALC-MCNC: 109 MG/DL (CALC)
LYMPHOCYTES # BLD AUTO: 2828 CELLS/UL (ref 850–3900)
LYMPHOCYTES NFR BLD AUTO: 43.5 %
MCH RBC QN AUTO: 29.9 PG (ref 27–33)
MCHC RBC AUTO-ENTMCNC: 31.8 G/DL (ref 32–36)
MCV RBC AUTO: 93.9 FL (ref 80–100)
MONOCYTES # BLD AUTO: 423 CELLS/UL (ref 200–950)
MONOCYTES NFR BLD AUTO: 6.5 %
NEUTROPHILS # BLD AUTO: 3114 CELLS/UL (ref 1500–7800)
NEUTROPHILS NFR BLD AUTO: 47.9 %
NONHDLC SERPL-MCNC: 135 MG/DL (CALC)
PLATELET # BLD AUTO: 250 THOUSAND/UL (ref 140–400)
PMV BLD REES-ECKER: 10.3 FL (ref 7.5–12.5)
POTASSIUM SERPL-SCNC: 4.7 MMOL/L (ref 3.5–5.3)
PROT SERPL-MCNC: 7.2 G/DL (ref 6.1–8.1)
RBC # BLD AUTO: 4.42 MILLION/UL (ref 3.8–5.1)
SODIUM SERPL-SCNC: 142 MMOL/L (ref 135–146)
TRIGL SERPL-MCNC: 147 MG/DL
TSH SERPL-ACNC: 2.6 MIU/L (ref 0.4–4.5)
WBC # BLD AUTO: 6.5 THOUSAND/UL (ref 3.8–10.8)

## 2025-08-18 DIAGNOSIS — Z91.89 AT HIGH RISK FOR CARDIOVASCULAR DISEASE: Primary | ICD-10-CM

## 2025-10-20 ENCOUNTER — APPOINTMENT (OUTPATIENT)
Dept: PRIMARY CARE | Facility: CLINIC | Age: 65
End: 2025-10-20
Payer: COMMERCIAL